# Patient Record
Sex: MALE | Race: WHITE | NOT HISPANIC OR LATINO | Employment: OTHER | URBAN - NONMETROPOLITAN AREA
[De-identification: names, ages, dates, MRNs, and addresses within clinical notes are randomized per-mention and may not be internally consistent; named-entity substitution may affect disease eponyms.]

---

## 2019-10-01 DIAGNOSIS — E78.5 HYPERLIPIDEMIA, UNSPECIFIED HYPERLIPIDEMIA TYPE: Primary | ICD-10-CM

## 2019-10-01 RX ORDER — ATORVASTATIN CALCIUM 40 MG/1
1 TABLET, FILM COATED ORAL DAILY
COMMUNITY
End: 2019-10-01 | Stop reason: SDUPTHER

## 2019-10-02 RX ORDER — ATORVASTATIN CALCIUM 40 MG/1
40 TABLET, FILM COATED ORAL DAILY
Qty: 90 TABLET | Refills: 0 | Status: SHIPPED | OUTPATIENT
Start: 2019-10-02 | End: 2020-01-14 | Stop reason: SDUPTHER

## 2019-10-15 DIAGNOSIS — I10 HYPERTENSION, UNSPECIFIED TYPE: Primary | ICD-10-CM

## 2019-10-15 RX ORDER — LISINOPRIL 40 MG/1
1 TABLET ORAL DAILY
COMMUNITY
End: 2019-10-15 | Stop reason: SDUPTHER

## 2019-10-15 RX ORDER — LISINOPRIL 40 MG/1
40 TABLET ORAL DAILY
Qty: 30 TABLET | Refills: 5 | Status: SHIPPED | OUTPATIENT
Start: 2019-10-15 | End: 2020-04-09 | Stop reason: SDUPTHER

## 2019-12-24 DIAGNOSIS — E78.5 HYPERLIPIDEMIA, UNSPECIFIED HYPERLIPIDEMIA TYPE: ICD-10-CM

## 2019-12-24 RX ORDER — ATORVASTATIN CALCIUM 40 MG/1
TABLET, FILM COATED ORAL
Qty: 90 TABLET | Refills: 0 | OUTPATIENT
Start: 2019-12-24

## 2020-01-02 ENCOUNTER — OFFICE VISIT (OUTPATIENT)
Dept: FAMILY MEDICINE CLINIC | Facility: CLINIC | Age: 70
End: 2020-01-02
Payer: MEDICARE

## 2020-01-02 VITALS
DIASTOLIC BLOOD PRESSURE: 58 MMHG | SYSTOLIC BLOOD PRESSURE: 136 MMHG | WEIGHT: 274.6 LBS | BODY MASS INDEX: 38.44 KG/M2 | HEIGHT: 71 IN | HEART RATE: 76 BPM | OXYGEN SATURATION: 97 %

## 2020-01-02 DIAGNOSIS — R35.1 NOCTURIA: ICD-10-CM

## 2020-01-02 DIAGNOSIS — E78.5 HYPERLIPIDEMIA, UNSPECIFIED HYPERLIPIDEMIA TYPE: ICD-10-CM

## 2020-01-02 DIAGNOSIS — E11.65 UNCONTROLLED TYPE 2 DIABETES MELLITUS WITH HYPERGLYCEMIA (HCC): Primary | ICD-10-CM

## 2020-01-02 DIAGNOSIS — F41.9 ANXIETY: ICD-10-CM

## 2020-01-02 DIAGNOSIS — I10 HYPERTENSION, UNSPECIFIED TYPE: ICD-10-CM

## 2020-01-02 PROCEDURE — 99214 OFFICE O/P EST MOD 30 MIN: CPT | Performed by: FAMILY MEDICINE

## 2020-01-02 RX ORDER — TRAZODONE HYDROCHLORIDE 50 MG/1
1 TABLET ORAL 2 TIMES DAILY
COMMUNITY
Start: 2019-12-21

## 2020-01-02 RX ORDER — PIOGLITAZONEHYDROCHLORIDE 15 MG/1
1 TABLET ORAL DAILY
Refills: 0 | COMMUNITY
Start: 2019-11-01 | End: 2021-06-22 | Stop reason: SDUPTHER

## 2020-01-02 RX ORDER — TRAMADOL HYDROCHLORIDE 50 MG/1
1 TABLET ORAL 3 TIMES DAILY PRN
Refills: 0 | COMMUNITY
Start: 2019-11-01 | End: 2020-01-02 | Stop reason: ALTCHOICE

## 2020-01-02 RX ORDER — ALPRAZOLAM 0.5 MG/1
0.5 TABLET ORAL 3 TIMES DAILY PRN
Qty: 30 TABLET | Refills: 2 | Status: SHIPPED | OUTPATIENT
Start: 2020-01-02 | End: 2020-03-31

## 2020-01-02 RX ORDER — GLYBURIDE 5 MG/1
1 TABLET ORAL 2 TIMES WEEKLY
Refills: 0 | COMMUNITY
Start: 2019-11-13 | End: 2020-02-13 | Stop reason: HOSPADM

## 2020-01-02 NOTE — ASSESSMENT & PLAN NOTE
No results found for: HGBA1C     Patient has uncontrolled diabetes mellitus  I ordered fasting blood work  His last hemoglobin A1c, from the only time I have seen him, was 11 2%  This was done on December 31, 2018  His fasting blood sugar at that time was 194  I reviewed the patient's medications  Without knowing what his blood work looks like, I cannot make any further comments  I am going to refer him to a dietitian  He is not following a diabetic diet  He needs to modify his carbohydrate intake  I recommended he begin an exercise program I also referred him to Ophthalmology for dilated eye exam   I will consider discontinuing his Actos, which was started in [place] this summer  He was not opposed to a once a week injection  However, of his numbers are really poor, we may need to consider starting him on insulin  I will make further recommendations to him when I get his blood test results  I tentatively scheduled him a follow-up visit for 6 weeks

## 2020-01-02 NOTE — ASSESSMENT & PLAN NOTE
Patient's blood pressure is reasonably well controlled    I have not made any changes with his current medication

## 2020-01-02 NOTE — ASSESSMENT & PLAN NOTE
Patient has hyperlipidemia  His goal LDL cholesterol is less than 70  I ordered a fasting lipid panel  I will make further recommendations when I get these results

## 2020-01-02 NOTE — PATIENT INSTRUCTIONS
Foot Care for People with Diabetes   WHAT YOU NEED TO KNOW:   · Foot care helps protect your feet and prevent foot ulcers or sores  Long-term high blood sugar levels can damage the blood vessels and nerves in your legs and feet  This damage makes it hard to feel pressure, pain, temperature, and touch  You may not be able to feel a cut or sore, or shoes that are too tight  Foot care is needed to prevent serious problems, such as an infection or amputation  · Diabetes may cause your toes to become crooked or curved under  These changes may affect the way you walk and can lead to increased pressure on your foot  The pressure can decrease blood flow to your feet  Lack of blood flow increases your risk for a foot ulcer  Do not ignore small problems, such as dry skin or small wounds  These can become life-threatening over time without proper care  DISCHARGE INSTRUCTIONS:   Contact your healthcare provider if:   · Your feet become numb, weak, or hard to move  · You have pus draining from a sore on your foot  · You have a wound on your foot that gets bigger, deeper, or does not heal      · You see blisters, cuts, scratches, calluses, or sores on your foot  · You have a fever, and your feet become red, warm, and swollen  · Your toenails become thick, curled, or yellow  · You find it hard to check your feet because your vision is poor  · You have questions or concerns about your condition or care  Foot care:   · Check your feet each day  Look at your whole foot, including the bottom, and between and under your toes  Check for wounds, corns, and calluses  Use a mirror to see the bottom of your feet  The skin on your feet may be shiny, tight, or darker than normal  Your feet may also be cold and pale  Feel your feet by running your hands along the tops, bottoms, sides, and between your toes  Redness, swelling, and warmth are signs of blood flow problems that can lead to a foot ulcer   Do not try to remove corns or calluses yourself  · Wash your feet each day with soap and warm water  Do not use hot water, because this can injure your foot  Dry your feet gently with a towel after you wash them  Dry between and under your toes  · Apply lotion or a moisturizer on your dry feet  Ask your healthcare provider what lotions are best to use  Do not put lotion or moisturizer between your toes  · Cut your toenails correctly  File or cut your toenails straight across  Use a soft brush to clean around your toenails  If your toenails are very thick, you may need to have a healthcare provider or specialist cut them  · Protect your feet  Do not walk barefoot or wear your shoes without socks  Check your shoes for rocks or other objects that can hurt your feet  Wear cotton socks to help keep your feet dry  Wear socks without toe seams, or wear them with the seams inside out  Change your socks each day  Do not wear socks that are dirty or damp  · Wear shoes that fit well  Wear shoes that do not rub against any area of your feet  Your shoes should be ½ to ¾ inch (1 to 2 centimeters) longer than your feet  Your shoes should also have extra space around the widest part of your feet  Walking or athletic shoes with laces or straps that adjust are best  Ask your healthcare provider for help to choose shoes that fit you best  Ask him if you need to wear an insert, orthotic, or bandage on your feet  Follow up with your healthcare provider or foot specialist as directed: You will need to have your feet checked at least once a year  You may need a foot exam more often if you have nerve damage, foot deformities, or ulcers  Write down your questions so you remember to ask them during your visits  © 2017 [facility] Information is for End User's use only and may not be sold, redistributed or otherwise used for commercial purposes   All illustrations and images included in CareNotes® are the copyrighted property of [employer]  or [employer]ector Mckeon  The above information is an  only  It is not intended as medical advice for individual conditions or treatments  Talk to your doctor, nurse or pharmacist before following any medical regimen to see if it is safe and effective for you

## 2020-01-02 NOTE — ASSESSMENT & PLAN NOTE
Patient has anxiety  He actually had an anxiety attack like he was here  He had to leave the exam room to go get a glass of water  At the conclusion of the visit, he had to go outside and walk around a bit before he can come back in to  his prescriptions for his blood work in his referrals  He had taken Xanax in the past   I refilled his prescription for Xanax to take as needed  He tells me he no longer takes tramadol

## 2020-01-02 NOTE — PROGRESS NOTES
Assessment/Plan:    Uncontrolled type 2 diabetes mellitus with hyperglycemia (HCC)    No results found for: HGBA1C     Patient has uncontrolled diabetes mellitus  I ordered fasting blood work  His last hemoglobin A1c, from the only time I have seen him, was 11 2%  This was done on December 31, 2018  His fasting blood sugar at that time was 194  I reviewed the patient's medications  Without knowing what his blood work looks like, I cannot make any further comments  I am going to refer him to a dietitian  He is not following a diabetic diet  He needs to modify his carbohydrate intake  I recommended he begin an exercise program I also referred him to Ophthalmology for dilated eye exam   I will consider discontinuing his Actos, which was started in [place] this summer  He was not opposed to a once a week injection  However, of his numbers are really poor, we may need to consider starting him on insulin  I will make further recommendations to him when I get his blood test results  I tentatively scheduled him a follow-up visit for 6 weeks  Hypertension  Patient's blood pressure is reasonably well controlled  I have not made any changes with his current medication    Anxiety  Patient has anxiety  He actually had an anxiety attack like he was here  He had to leave the exam room to go get a glass of water  At the conclusion of the visit, he had to go outside and walk around a bit before he can come back in to  his prescriptions for his blood work in his referrals  He had taken Xanax in the past   I refilled his prescription for Xanax to take as needed  He tells me he no longer takes tramadol  Hyperlipidemia  Patient has hyperlipidemia  His goal LDL cholesterol is less than 70  I ordered a fasting lipid panel  I will make further recommendations when I get these results         Diagnoses and all orders for this visit:    Uncontrolled type 2 diabetes mellitus with hyperglycemia ([facility] [address] )  - Ambulatory referral to Nutrition Services; Future  -     Ambulatory referral to Ophthalmology; Future  -     Cancel: Microalbumin / creatinine urine ratio  -     Hemoglobin A1C; Future  -     Comprehensive metabolic panel; Future  -     Microalbumin / creatinine urine ratio    Anxiety  -     ALPRAZolam (XANAX) 0 5 mg tablet; Take 1 tablet (0 5 mg total) by mouth 3 (three) times a day as needed for anxiety    Hyperlipidemia, unspecified hyperlipidemia type  -     Lipid panel; Future    Hypertension, unspecified type    Nocturia  -     PSA Total, Diagnostic; Future    Other orders  -     glyBURIDE (DIABETA) 5 mg tablet; Take 1 tablet by mouth 2 (two) times a week  -     metFORMIN (GLUCOPHAGE) 1000 MG tablet; Take 1 tablet by mouth 2 (two) times a day  -     pioglitazone (ACTOS) 15 mg tablet; Take 1 tablet by mouth daily  -     Discontinue: traMADol (ULTRAM) 50 mg tablet; Take 1 tablet by mouth 3 (three) times a day as needed  -     traZODone (DESYREL) 50 mg tablet; Take 1 tablet by mouth 2 (two) times a day  -     Multiple Vitamins-Minerals (CENTRUM SILVER 50+MEN) TABS; Take 1 tablet by mouth daily        BMI Counseling: Body mass index is 38 3 kg/m²  The BMI is above normal  Nutrition recommendations include decreasing portion sizes, encouraging healthy choices of fruits and vegetables, decreasing fast food intake, consuming healthier snacks, limiting drinks that contain sugar and moderation in carbohydrate intake  Exercise recommendations include exercising 3-5 times per week  No pharmacotherapy was ordered  Patient referred to nutritionist due to patient being overweight  Subjective:      Patient ID: Brielle Albert is a 81 y o  male  This patient is a 61-year-old white male who I have not seen in just over 1 year  The patient tells me he is spending time between [place] and here  He plans to spend his [place] here and is summers in [place]    He tells me he moved back to [place] in mid October  He plans to return to [place] in March  Patient states that his blood sugars have been very high  He he had it checked by an orthopedic surgeon out in [place]  He wanted to have a knee replacement  They would not do it because his diabetes is uncontrolled  He tells me he wound up seeing 2 doctor's in [place] and was scheduled to see an endocrinologist but he he moved back to [place]  He does not know what his hemoglobin A1c was  I do not have any records to review  When I asked him what type of blood sugars he is seeing when he checks it himself, he tells me it depends  He seems to be running 170 is in the morning fasting  He tells me it is usually better in the afternoon  He states he is up-to-date with his colonoscopies  He has had 2 of them, both in [place]  He does not remember the name of the doctor  The following portions of the patient's history were reviewed and updated as appropriate: allergies, current medications, past family history, past medical history, past social history, past surgical history and problem list     Review of Systems   Constitutional: Positive for unexpected weight change  Negative for activity change and appetite change  Respiratory: Negative for cough, shortness of breath and wheezing  Cardiovascular: Negative for chest pain, palpitations and leg swelling  Gastrointestinal: Negative for abdominal distention, abdominal pain, blood in stool, constipation, diarrhea, nausea and vomiting  Endocrine: Negative for polydipsia, polyphagia and polyuria  Neurological: Facial asymmetry:  complains of weight gain  Psychiatric/Behavioral: The patient is nervous/anxious (Reports panic attacks)  Objective:    Patient's shoes and socks removed  Right Foot/Ankle   Right Foot Inspection  Skin Exam: skin normal and skin intact no dry skin, no warmth, no callus, no erythema, no maceration, no abnormal color, no pre-ulcer, no ulcer and no callus                          Toe Exam: no swelling, no tenderness, erythema and  no right toe deformity  Sensory   Vibration: intact  Proprioception: intact   Monofilament testing: intact  Vascular  Capillary refills: < 3 seconds  The right DP pulse is 1+  The right PT pulse is 1+  Left Foot/Ankle  Left Foot Inspection  Skin Exam: skin normal and skin intactno dry skin, no warmth, no erythema, no maceration, normal color, no pre-ulcer, no ulcer and no callus                         Toe Exam: no swelling, no tenderness and no erythema                   Sensory   Vibration: intact  Proprioception: intact  Monofilament: intact  Vascular  Capillary refills: < 3 seconds  The left DP pulse is 1+  The left PT pulse is 1+  Assign Risk Category:  No deformity present; No loss of protective sensation; No weak pulses       Risk: 0    /58 (BP Location: Left arm, Patient Position: Sitting, Cuff Size: Large)   Pulse 76   Ht 5' 11" (1 803 m)   Wt 125 kg (274 lb 9 6 oz)   SpO2 97%   BMI 38 30 kg/m²          Physical Exam   Constitutional:   The patient is an obese 71-year-old white male who appears his stated age  He is in no apparent distress   HENT:   Head: Normocephalic and atraumatic  Right Ear: External ear normal    Left Ear: External ear normal    Mouth/Throat: Oropharynx is clear and moist  No oropharyngeal exudate  Tympanic membranes are clear   Eyes: Pupils are equal, round, and reactive to light  Conjunctivae are normal  No scleral icterus  Neck: Neck supple  No tracheal deviation present  No thyromegaly present  There are no carotid bruits   Cardiovascular: Normal rate, regular rhythm and normal heart sounds  Exam reveals no gallop and no friction rub  Pulses are no weak pulses  No murmur heard  Pulses:       Dorsalis pedis pulses are 1+ on the right side, and 1+ on the left side  Posterior tibial pulses are 1+ on the right side, and 1+ on the left side     Pulmonary/Chest: Effort normal and breath sounds normal  No stridor  No respiratory distress  He has no wheezes  He has no rales  Abdominal: Soft  Bowel sounds are normal  He exhibits no distension and no mass  There is no tenderness  There is no rebound and no guarding  There is no organomegaly noted   Feet:   Right Foot:   Skin Integrity: Negative for ulcer, skin breakdown, erythema, warmth, callus or dry skin  Left Foot:   Skin Integrity: Negative for ulcer, skin breakdown, erythema, warmth, callus or dry skin  Lymphadenopathy:     He has no cervical adenopathy  Vitals reviewed      extremities:  Without cyanosis, clubbing, or edema

## 2020-01-09 ENCOUNTER — APPOINTMENT (OUTPATIENT)
Dept: LAB | Facility: MEDICAL CENTER | Age: 70
End: 2020-01-09
Payer: MEDICARE

## 2020-01-09 DIAGNOSIS — R35.1 NOCTURIA: ICD-10-CM

## 2020-01-09 DIAGNOSIS — E78.5 HYPERLIPIDEMIA, UNSPECIFIED HYPERLIPIDEMIA TYPE: ICD-10-CM

## 2020-01-09 DIAGNOSIS — E11.65 UNCONTROLLED TYPE 2 DIABETES MELLITUS WITH HYPERGLYCEMIA (HCC): ICD-10-CM

## 2020-01-09 LAB
ALBUMIN SERPL BCP-MCNC: 3.8 G/DL (ref 3.5–5)
ALP SERPL-CCNC: 94 U/L (ref 46–116)
ALT SERPL W P-5'-P-CCNC: 60 U/L (ref 12–78)
ANION GAP SERPL CALCULATED.3IONS-SCNC: 6 MMOL/L (ref 4–13)
AST SERPL W P-5'-P-CCNC: 26 U/L (ref 5–45)
BILIRUB SERPL-MCNC: 0.4 MG/DL (ref 0.2–1)
BUN SERPL-MCNC: 17 MG/DL (ref 5–25)
CALCIUM SERPL-MCNC: 9.5 MG/DL (ref 8.3–10.1)
CHLORIDE SERPL-SCNC: 107 MMOL/L (ref 100–108)
CHOLEST SERPL-MCNC: 178 MG/DL (ref 50–200)
CO2 SERPL-SCNC: 26 MMOL/L (ref 21–32)
CREAT SERPL-MCNC: 1.24 MG/DL (ref 0.6–1.3)
CREAT UR-MCNC: 488 MG/DL
GFR SERPL CREATININE-BSD FRML MDRD: 60 ML/MIN/1.73SQ M
GLUCOSE P FAST SERPL-MCNC: 185 MG/DL (ref 65–99)
HDLC SERPL-MCNC: 51 MG/DL
LDLC SERPL CALC-MCNC: 78 MG/DL (ref 0–100)
MICROALBUMIN UR-MCNC: 115 MG/L (ref 0–20)
MICROALBUMIN/CREAT 24H UR: 24 MG/G CREATININE (ref 0–30)
NONHDLC SERPL-MCNC: 127 MG/DL
POTASSIUM SERPL-SCNC: 4.7 MMOL/L (ref 3.5–5.3)
PROT SERPL-MCNC: 7.5 G/DL (ref 6.4–8.2)
PSA SERPL-MCNC: 5 NG/ML (ref 0–4)
SODIUM SERPL-SCNC: 139 MMOL/L (ref 136–145)
TRIGL SERPL-MCNC: 244 MG/DL

## 2020-01-09 PROCEDURE — 80053 COMPREHEN METABOLIC PANEL: CPT

## 2020-01-09 PROCEDURE — 82570 ASSAY OF URINE CREATININE: CPT | Performed by: FAMILY MEDICINE

## 2020-01-09 PROCEDURE — 84153 ASSAY OF PSA TOTAL: CPT

## 2020-01-09 PROCEDURE — 36415 COLL VENOUS BLD VENIPUNCTURE: CPT

## 2020-01-09 PROCEDURE — 80061 LIPID PANEL: CPT

## 2020-01-09 PROCEDURE — 82043 UR ALBUMIN QUANTITATIVE: CPT | Performed by: FAMILY MEDICINE

## 2020-01-10 DIAGNOSIS — E11.65 UNCONTROLLED TYPE 2 DIABETES MELLITUS WITH HYPERGLYCEMIA (HCC): Primary | ICD-10-CM

## 2020-01-13 ENCOUNTER — APPOINTMENT (OUTPATIENT)
Dept: LAB | Facility: MEDICAL CENTER | Age: 70
End: 2020-01-13
Payer: MEDICARE

## 2020-01-13 LAB
EST. AVERAGE GLUCOSE BLD GHB EST-MCNC: 197 MG/DL
HBA1C MFR BLD: 8.5 % (ref 4.2–6.3)

## 2020-01-13 PROCEDURE — 36415 COLL VENOUS BLD VENIPUNCTURE: CPT

## 2020-01-13 PROCEDURE — 83036 HEMOGLOBIN GLYCOSYLATED A1C: CPT

## 2020-01-14 DIAGNOSIS — E78.5 HYPERLIPIDEMIA, UNSPECIFIED HYPERLIPIDEMIA TYPE: ICD-10-CM

## 2020-01-15 RX ORDER — ATORVASTATIN CALCIUM 40 MG/1
40 TABLET, FILM COATED ORAL DAILY
Qty: 90 TABLET | Refills: 3 | Status: SHIPPED | OUTPATIENT
Start: 2020-01-15 | End: 2021-04-07 | Stop reason: SDUPTHER

## 2020-01-16 DIAGNOSIS — E11.65 UNCONTROLLED TYPE 2 DIABETES MELLITUS WITH HYPERGLYCEMIA (HCC): Primary | ICD-10-CM

## 2020-01-16 NOTE — PROGRESS NOTES
I spoke to the patient today and informed him of his blood test results  Fasting blood sugars 185  Hemoglobin A1c is 8 5  Patient tells me that since his visit, he has been following my instructions  He is limiting his carbs  He cut out sugary drinks  He cut out sugary snacks  He has been eating smaller portion size  He has lost 5 lb in his blood sugars have been coming down nicely  He does not want to changes medicine at this time  I told him we need to get his hemoglobin A1c down  His goal is less than 7 0  We will need to consider titrating the dose of his medication if it does not come down  I reviewed his lipid panel with him  Cholesterol was satisfactory but triglycerides are elevated  Weight loss will help this as well  Lastly, we discussed his PSA  I have recommended consultation with Urology  He was agreeable  He will be referred to Dr Tiffanie Hooks

## 2020-01-27 LAB
LEFT EYE DIABETIC RETINOPATHY: NORMAL
RIGHT EYE DIABETIC RETINOPATHY: NORMAL

## 2020-02-03 DIAGNOSIS — E11.65 UNCONTROLLED TYPE 2 DIABETES MELLITUS WITH HYPERGLYCEMIA (HCC): Primary | ICD-10-CM

## 2020-02-04 ENCOUNTER — CONSULT (OUTPATIENT)
Dept: UROLOGY | Facility: CLINIC | Age: 70
End: 2020-02-04
Payer: MEDICARE

## 2020-02-04 VITALS
SYSTOLIC BLOOD PRESSURE: 122 MMHG | DIASTOLIC BLOOD PRESSURE: 72 MMHG | BODY MASS INDEX: 36.42 KG/M2 | WEIGHT: 260.14 LBS | HEIGHT: 71 IN | HEART RATE: 78 BPM

## 2020-02-04 DIAGNOSIS — R97.20 ELEVATED PSA: Primary | ICD-10-CM

## 2020-02-04 DIAGNOSIS — E11.65 UNCONTROLLED TYPE 2 DIABETES MELLITUS WITH HYPERGLYCEMIA (HCC): ICD-10-CM

## 2020-02-04 LAB
POST-VOID RESIDUAL VOLUME, ML POC: 0 ML
SL AMB  POCT GLUCOSE, UA: 2000
SL AMB LEUKOCYTE ESTERASE,UA: NORMAL
SL AMB POCT BILIRUBIN,UA: NORMAL
SL AMB POCT BLOOD,UA: NORMAL
SL AMB POCT CLARITY,UA: CLEAR
SL AMB POCT COLOR,UA: YELLOW
SL AMB POCT KETONES,UA: NORMAL
SL AMB POCT NITRITE,UA: NORMAL
SL AMB POCT PH,UA: 5
SL AMB POCT SPECIFIC GRAVITY,UA: 1.02
SL AMB POCT URINE PROTEIN: NORMAL
SL AMB POCT UROBILINOGEN: NORMAL

## 2020-02-04 PROCEDURE — 81002 URINALYSIS NONAUTO W/O SCOPE: CPT | Performed by: PHYSICIAN ASSISTANT

## 2020-02-04 PROCEDURE — 99203 OFFICE O/P NEW LOW 30 MIN: CPT | Performed by: PHYSICIAN ASSISTANT

## 2020-02-04 PROCEDURE — 51798 US URINE CAPACITY MEASURE: CPT | Performed by: PHYSICIAN ASSISTANT

## 2020-02-04 NOTE — PROGRESS NOTES
2/4/2020      Chief Complaint   Patient presents with    Elevated PSA         Assessment and Plan    78 y o  male managed by NEW PATIENT    1  Elevated PSA  - PSA 5 0 (1/9/2020)  - SHANNAN smooth, mildly enlarged, without appreciable nodule  - positive family history (father)    Offer patient this is likely his baseline, is minimally elevated for his age  His digital rectal exam is overall benign  He is unsure if he will be able to get records of prior PSAs for comparison  I have offered him repeat PSA in about six months  He is moving back to [place] next week and does not know if or when he will be back but he may do six months here and six months there throughout the year  He will notify us when he is back in town to schedule a follow-up appointment with labs prior  History of Present Illness  Michelle Santo is a 78 y o  male here for evaluation of new patient evaluation of elevated PSA  Recent screening labs include a PSA on 01/09/2020, PSA was 5 0  Patient reports prior PSA did just above baseline in his home state of [place]  He is not sure specific numbers  He is not sure if he has ever had a formal digital rectal exam for prostate exam however he has had colonoscopies  He does have a positive family history, his father was diagnosed with prostate cancer around age [de-identified], and he went on delivered age 82  Patient has 0 lower urinary tract symptoms or bother  No UTIs and no hematuria  No ejaculatory concerns  Review of Systems   Constitutional: Negative  Respiratory: Negative  Cardiovascular: Negative  Genitourinary: Negative for decreased urine volume, difficulty urinating, dysuria, flank pain, frequency, hematuria and urgency  Musculoskeletal: Negative  Urinary Incontinence Screening      Most Recent Value   Urinary Incontinence   Urinary Incontinence? No   Incomplete emptying? No   Urinary frequency? No   Urinary urgency? No   Urinary hesitancy?   No   Dysuria (painful difficult urination)? No   Nocturia (waking up to use the bathroom)? No   Straining (having to push to go)? No   Weak stream?  No   Intermittent stream?  No   Post void dribbling? No               Past Medical History  Past Medical History:   Diagnosis Date    Hyperlipidemia     last assessed: 12/31/2018    Hypertension     last assessed: 12/31/2018    Osteoarthritis     last assessed: 12/31/2018    Type 2 diabetes mellitus ([facility] )     last assessed: 12/31/2018     Past Social History  History reviewed  No pertinent surgical history    Social History     Tobacco Use   Smoking Status Former Smoker   Smokeless Tobacco Never Used     Past Family History  Family History   Problem Relation Age of Onset    Parkinsonism Mother     Breast cancer Sister      Past Social history  Social History     Socioeconomic History    Marital status:      Spouse name: Not on file    Number of children: Not on file    Years of education: Not on file    Highest education level: Not on file   Occupational History    Not on file   Social Needs    Financial resource strain: Not on file    Food insecurity:     Worry: Not on file     Inability: Not on file    Transportation needs:     Medical: Not on file     Non-medical: Not on file   Tobacco Use    Smoking status: Former Smoker    Smokeless tobacco: Never Used   Substance and Sexual Activity    Alcohol use: Not Currently    Drug use: Never    Sexual activity: Not on file   Lifestyle    Physical activity:     Days per week: Not on file     Minutes per session: Not on file    Stress: Not on file   Relationships    Social connections:     Talks on phone: Not on file     Gets together: Not on file     Attends Congregation service: Not on file     Active member of club or organization: Not on file     Attends meetings of clubs or organizations: Not on file     Relationship status: Not on file    Intimate partner violence:     Fear of current or ex partner: Not on file     Emotionally abused: Not on file     Physically abused: Not on file     Forced sexual activity: Not on file   Other Topics Concern    Not on file   Social History Narrative    Not on file     Current Medications  Current Outpatient Medications   Medication Sig Dispense Refill    ALPRAZolam (XANAX) 0 5 mg tablet Take 1 tablet (0 5 mg total) by mouth 3 (three) times a day as needed for anxiety 30 tablet 2    atorvastatin (LIPITOR) 40 mg tablet Take 1 tablet (40 mg total) by mouth daily 90 tablet 3    glyBURIDE (DIABETA) 5 mg tablet Take 1 tablet by mouth 2 (two) times a week  0    lisinopril (ZESTRIL) 40 mg tablet Take 1 tablet (40 mg total) by mouth daily 30 tablet 5    metFORMIN (GLUCOPHAGE) 1000 MG tablet Take 1 tablet (1,000 mg total) by mouth 2 (two) times a day 180 tablet 1    Multiple Vitamins-Minerals (CENTRUM SILVER 50+MEN) TABS Take 1 tablet by mouth daily      pioglitazone (ACTOS) 15 mg tablet Take 1 tablet by mouth daily  0    traZODone (DESYREL) 50 mg tablet Take 1 tablet by mouth 2 (two) times a day       No current facility-administered medications for this visit  Allergies  No Known Allergies      The following portions of the patient's history were reviewed and updated as appropriate: allergies, current medications, past medical history, past social history, past surgical history and problem list       Vitals  Vitals:    02/04/20 1347   BP: 122/72   Pulse: 78   Weight: 118 kg (260 lb 2 3 oz)   Height: 5' 11" (1 803 m)       Physical Exam   Constitutional: He is oriented to person, place, and time  He appears well-developed and well-nourished  No distress  HENT:   Head: Normocephalic and atraumatic  Pulmonary/Chest: Effort normal    Genitourinary:   Genitourinary Comments: Circumcised penis, normal phallus, orthotopic and patent meatus  Testes smooth descended bilaterally to scrotum    Skin is normal   Digital rectal exam reveals enlarged about 40 gram prostate, smooth, without appreciable nodule, nontender   Musculoskeletal: He exhibits no edema  Neurological: He is alert and oriented to person, place, and time  Gait normal    Skin: Skin is warm and dry  He is not diaphoretic  Psychiatric: He has a normal mood and affect  His speech is normal and behavior is normal    Nursing note and vitals reviewed          Results  No results found for this or any previous visit (from the past 1 hour(s)) ]  Lab Results   Component Value Date    PSA 5 0 (H) 01/09/2020     Lab Results   Component Value Date    CALCIUM 9 5 01/09/2020    K 4 7 01/09/2020    CO2 26 01/09/2020     01/09/2020    BUN 17 01/09/2020    CREATININE 1 24 01/09/2020     No results found for: WBC, HGB, HCT, MCV, PLT      Orders  Orders Placed This Encounter   Procedures    PSA Total, Diagnostic     Standing Status:   Future     Standing Expiration Date:   2/4/2021    POCT urine dip    POCT Measure PVR

## 2020-02-04 NOTE — PATIENT INSTRUCTIONS
Prostate specific antigen measurement   GENERAL INFORMATION:  What is this test?  This test measures the amount of prostate specific antigen (PSA) in blood  This test may be used when benign prostatic hyperplasia (BPH) is suspected  It may also be used to screen for prostate cancer, to help diagnose prostate cancer when it is suspected, and to monitor prostate cancer after treatment  What are other names for this test?  · PSA measurement  · PSA - Prostate-specific antigen level  · PSA - Serum prostate specific antigen level  · tPSA measurement - Total prostate specific antigen measurement  What are related tests? · Rectal examination  · Transrectal biopsy of prostate  Why do I need this test?  Laboratory tests may be done for many reasons  Tests are performed for routine health screenings or if a disease or toxicity is suspected  Lab tests may be used to determine if a medical condition is improving or worsening  Lab tests may also be used to measure the success or failure of a medication or treatment plan  Lab tests may be ordered for professional or legal reasons  You may need this test if you have:   · BPH - Benign prostatic hypertrophy  · Prostate cancer  When and how often should I have this test?  When and how often laboratory tests are done may depend on many factors  The timing of laboratory tests may rely on the results or completion of other tests, procedures, or treatments  Lab tests may be performed immediately in an emergency, or tests may be delayed as a condition is treated or monitored  A test may be suggested or become necessary when certain signs or symptoms appear  Due to changes in the way your body naturally functions through the course of a day, lab tests may need to be performed at a certain time of day  If you have prepared for a test by changing your food or fluid intake, lab tests may be timed in accordance with those changes   Timing of tests may be based on increased and decreased levels of medications, drugs or other substances in the body  The age or gender of the person being tested may affect when and how often a lab test is required  Chronic or progressive conditions may need ongoing monitoring through the use of lab tests  Conditions that worsen and improve may also need frequent monitoring  Certain tests may be repeated to obtain a series of results, or tests may need to be repeated to confirm or disprove results  Timing and frequency of lab tests may vary if they are performed for professional or legal reasons  How should I get ready for the test?  Before having blood collected, tell the person drawing your blood if you are allergic to latex  Tell the healthcare worker if you have a medical condition or are using a medication or supplement that causes excessive bleeding  Also tell the healthcare worker if you have felt nauseated, lightheaded, or have fainted while having blood drawn in the past   How is the test done? When a blood sample from a vein is needed, a vein in your arm is usually selected  A tourniquet (large rubber strap) may be secured above the vein  The skin over the vein will be cleaned, and a needle will be inserted  You will be asked to hold very still while your blood is collected  Blood will be collected into one or more tubes, and the tourniquet will be removed  When enough blood has been collected, the healthcare worker will take the needle out  How will the test feel? The amount of discomfort you feel will depend on many factors, including your sensitivity to pain  Communicate how you are feeling with the person doing the test  Inform the person doing the test if you feel that you cannot continue with the test   During a blood draw, you may feel mild discomfort at the location where the blood sample is being collected    What should I do after the test?  After a blood sample is collected from your vein, a bandage, cotton ball, or gauze may be placed on the area where the needle was inserted  You may be asked to apply pressure to the area  Avoid strenuous exercise immediately after your blood draw  Contact your healthcare worker if you feel pain or see redness, swelling, or discharge from the puncture site  What are the risks? Blood: During a blood draw, a hematoma (blood-filled bump under the skin) or slight bleeding from the puncture site may occur  After a blood draw, a bruise or infection may occur at the puncture site  The person doing this test may need to perform it more than once  Talk to your healthcare worker if you have any concerns about the risks of this test   What are normal results for this test?  Laboratory test results may vary depending on your age, gender, health history, the method used for the test, and many other factors  If your results are different from the results suggested below, this may not mean that you have a disease  Contact your healthcare worker if you have any questions  The following are considered to be normal results for this test:  · Males, ?36years of age: 0-2 ng/mL (0-2 mcg/L) :  · Males, >36years of age: 0-4 ng/mL (0-4 mcg/L)  · Females: <0 5 ng/mL (<0 5 mcg/L) : What might affect my test results? Drug Therapy Use:  Some medications may affect the results of the test  These medications include:  · Finasteride (Oral route, Tablet)  · Indium In 111 Capromab Pendetide (Intravenous route, Kit)  Ejaculation and digital rectal exam can cause an insignificant rise in PSA levels while prostate biopsy and cystoscopy can cause substantial increases; PSA testing should be delayed until 3 to 4 weeks after these procedures  Exercise, infection, and some medications can also cause a rise in PSA levels  Finasteride will lower PSA by approximately 50%   What follow up should I do after this test?  Ask your healthcare worker how you will be informed of the test results   You may be asked to call for results, schedule an appointment to discuss results, or notified of results by mail  Follow up care varies depending on many factors related to your test  Sometimes there is no follow up after you have been notified of test results  At other times follow up may be suggested or necessary  Some examples of follow up care include changes to medication or treatment plans, referral to a specialist, more or less frequent monitoring, and additional tests or procedures  Talk with your healthcare worker about any concerns or questions you have regarding follow up care or instructions  Where can I get more information? Related Companies   ? American Urological Association - https://www alexander org/  org  ? National Kidney and Urologic Diseases Information Clearinghouse - http://kidney  niddk nih gov/    CARE AGREEMENT:   You have the right to help plan your care  To help with this plan, you must learn about your health condition and how it may be treated  You can then discuss treatment options with your caregivers  Work with them to decide what care may be used to treat you  You always have the right to refuse treatment  © Copyright [employer] 2018  Information is for End User's use only and may not be sold, redistributed or otherwise used for commercial purposes  The above information is an  only  It is not intended as a substitute for medical advice for your individual conditions or treatments  Talk to your doctor, nurse or pharmacist before following any medical regimen to see if it is safe and effective for you  done

## 2020-02-12 ENCOUNTER — TRANSCRIBE ORDERS (OUTPATIENT)
Dept: LAB | Facility: HOSPITAL | Age: 70
End: 2020-02-12

## 2020-02-13 ENCOUNTER — OFFICE VISIT (OUTPATIENT)
Dept: FAMILY MEDICINE CLINIC | Facility: CLINIC | Age: 70
End: 2020-02-13
Payer: MEDICARE

## 2020-02-13 VITALS
DIASTOLIC BLOOD PRESSURE: 80 MMHG | SYSTOLIC BLOOD PRESSURE: 130 MMHG | HEIGHT: 71 IN | OXYGEN SATURATION: 94 % | BODY MASS INDEX: 37.24 KG/M2 | HEART RATE: 78 BPM | WEIGHT: 266 LBS

## 2020-02-13 DIAGNOSIS — E11.65 UNCONTROLLED TYPE 2 DIABETES MELLITUS WITH HYPERGLYCEMIA (HCC): Primary | ICD-10-CM

## 2020-02-13 DIAGNOSIS — E78.2 MIXED HYPERLIPIDEMIA: ICD-10-CM

## 2020-02-13 DIAGNOSIS — Z00.00 ENCOUNTER FOR MEDICARE ANNUAL WELLNESS EXAM: ICD-10-CM

## 2020-02-13 DIAGNOSIS — I10 ESSENTIAL HYPERTENSION: ICD-10-CM

## 2020-02-13 PROCEDURE — 1036F TOBACCO NON-USER: CPT | Performed by: FAMILY MEDICINE

## 2020-02-13 PROCEDURE — 2022F DILAT RTA XM EVC RTNOPTHY: CPT | Performed by: FAMILY MEDICINE

## 2020-02-13 PROCEDURE — 99213 OFFICE O/P EST LOW 20 MIN: CPT | Performed by: FAMILY MEDICINE

## 2020-02-13 PROCEDURE — G0438 PPPS, INITIAL VISIT: HCPCS | Performed by: FAMILY MEDICINE

## 2020-02-13 PROCEDURE — 3075F SYST BP GE 130 - 139MM HG: CPT | Performed by: FAMILY MEDICINE

## 2020-02-13 PROCEDURE — 3008F BODY MASS INDEX DOCD: CPT | Performed by: FAMILY MEDICINE

## 2020-02-13 PROCEDURE — 3079F DIAST BP 80-89 MM HG: CPT | Performed by: FAMILY MEDICINE

## 2020-02-13 PROCEDURE — 1123F ACP DISCUSS/DSCN MKR DOCD: CPT | Performed by: FAMILY MEDICINE

## 2020-02-13 PROCEDURE — 3060F POS MICROALBUMINURIA REV: CPT | Performed by: FAMILY MEDICINE

## 2020-02-13 PROCEDURE — 1170F FXNL STATUS ASSESSED: CPT | Performed by: FAMILY MEDICINE

## 2020-02-13 PROCEDURE — 1160F RVW MEDS BY RX/DR IN RCRD: CPT | Performed by: FAMILY MEDICINE

## 2020-02-13 PROCEDURE — 1125F AMNT PAIN NOTED PAIN PRSNT: CPT | Performed by: FAMILY MEDICINE

## 2020-02-13 RX ORDER — GLIMEPIRIDE 4 MG/1
4 TABLET ORAL 2 TIMES DAILY
Qty: 180 TABLET | Refills: 2 | Status: SHIPPED | OUTPATIENT
Start: 2020-02-13 | End: 2021-03-05

## 2020-02-13 NOTE — PROGRESS NOTES
Assessment and Plan:     Problem List Items Addressed This Visit     None           Preventive health issues were discussed with patient, and age appropriate screening tests were ordered as noted in patient's After Visit Summary  Personalized health advice and appropriate referrals for health education or preventive services given if needed, as noted in patient's After Visit Summary  History of Present Illness:     Patient presents for Medicare Annual Wellness visit    Patient Care Team:  Victor Hugo Cervantes DO as PCP - General (Family Medicine)     Problem List:     Patient Active Problem List   Diagnosis    Uncontrolled type 2 diabetes mellitus with hyperglycemia ([address] )    Hyperlipidemia    Hypertension    Anxiety    Nocturia      Past Medical and Surgical History:     Past Medical History:   Diagnosis Date    Hyperlipidemia     last assessed: 12/31/2018    Hypertension     last assessed: 12/31/2018    Osteoarthritis     last assessed: 12/31/2018    Type 2 diabetes mellitus ([facility] [address] )     last assessed: 12/31/2018     History reviewed  No pertinent surgical history     Family History:     Family History   Problem Relation Age of Onset   Saniya Augustin Parkinsonism Mother     Breast cancer Sister       Social History:     E-Cigarette/Vaping    E-Cigarette Use Never User      E-Cigarette/Vaping Substances    Nicotine No     THC No     CBD No     Flavoring No     Other No     Unknown No      Social History     Socioeconomic History    Marital status:      Spouse name: None    Number of children: None    Years of education: None    Highest education level: None   Occupational History    None   Social Needs    Financial resource strain: None    Food insecurity:     Worry: None     Inability: None    Transportation needs:     Medical: None     Non-medical: None   Tobacco Use    Smoking status: Former Smoker    Smokeless tobacco: Never Used   Substance and Sexual Activity    Alcohol use: Not Currently Frequency: Monthly or less     Drinks per session: 1 or 2     Binge frequency: Never    Drug use: Never    Sexual activity: None   Lifestyle    Physical activity:     Days per week: None     Minutes per session: None    Stress: None   Relationships    Social connections:     Talks on phone: None     Gets together: None     Attends Zoroastrianism service: None     Active member of club or organization: None     Attends meetings of clubs or organizations: None     Relationship status: None    Intimate partner violence:     Fear of current or ex partner: None     Emotionally abused: None     Physically abused: None     Forced sexual activity: None   Other Topics Concern    None   Social History Narrative    None      Medications and Allergies:     Current Outpatient Medications   Medication Sig Dispense Refill    ALPRAZolam (XANAX) 0 5 mg tablet Take 1 tablet (0 5 mg total) by mouth 3 (three) times a day as needed for anxiety 30 tablet 2    atorvastatin (LIPITOR) 40 mg tablet Take 1 tablet (40 mg total) by mouth daily 90 tablet 3    glyBURIDE (DIABETA) 5 mg tablet Take 1 tablet by mouth 2 (two) times a week  0    lisinopril (ZESTRIL) 40 mg tablet Take 1 tablet (40 mg total) by mouth daily 30 tablet 5    metFORMIN (GLUCOPHAGE) 1000 MG tablet Take 1 tablet (1,000 mg total) by mouth 2 (two) times a day 180 tablet 1    Multiple Vitamins-Minerals (CENTRUM SILVER 50+MEN) TABS Take 1 tablet by mouth daily      pioglitazone (ACTOS) 15 mg tablet Take 1 tablet by mouth daily  0    traZODone (DESYREL) 50 mg tablet Take 1 tablet by mouth 2 (two) times a day       No current facility-administered medications for this visit        No Known Allergies   Immunizations:     Immunization History   Administered Date(s) Administered    Tdap 08/16/2016      Health Maintenance:         Topic Date Due    Hepatitis C Screening  1950    CRC Screening: Colonoscopy  1950         Topic Date Due    Pneumococcal Vaccine: 65+ Years (1 of 2 - PCV13) 02/02/2017      Medicare Health Risk Assessment:     /80 (BP Location: Left arm, Patient Position: Sitting, Cuff Size: Large)   Pulse 78   Ht 5' 11" (1 803 m)   Wt 121 kg (266 lb)   SpO2 94%   BMI 37 10 kg/m²      Irene Galloway is here for his Initial Wellness visit  Health Risk Assessment:   Patient rates overall health as good  Patient feels that their physical health rating is same  Eyesight was rated as much worse  Hearing was rated as same  Patient feels that their emotional and mental health rating is same  Pain experienced in the last 7 days has been none  Patient states that he has experienced no weight loss or gain in last 6 months  Depression Screening:   PHQ-2 Score: 0      Fall Risk Screening: In the past year, patient has experienced: no history of falling in past year      Home Safety:  Patient does not have trouble with stairs inside or outside of their home  Patient has working smoke alarms and has working carbon monoxide detector  Home safety hazards include: not having non-slip bath and/or shower mats  Nutrition:   Current diet is Regular  Medications:   Patient is currently taking over-the-counter supplements  OTC medications include: see medication list  Patient is able to manage medications  Activities of Daily Living (ADLs)/Instrumental Activities of Daily Living (IADLs):   Walk and transfer into and out of bed and chair?: Yes  Dress and groom yourself?: Yes    Bathe or shower yourself?: Yes    Feed yourself? Yes  Do your laundry/housekeeping?: Yes  Manage your money, pay your bills and track your expenses?: Yes  Make your own meals?: Yes    Do your own shopping?: Yes    Previous Hospitalizations:   Any hospitalizations or ED visits within the last 12 months?: No      Advance Care Planning:   Living will: Yes    Durable POA for healthcare:  Yes    Advanced directive: Yes      Cognitive Screening:   Provider or family/friend/caregiver concerned regarding cognition?: No    PREVENTIVE SCREENINGS      Cardiovascular Screening:    General: Screening Not Indicated and History Lipid Disorder      Diabetes Screening:     General: Screening Not Indicated and History Diabetes      Colorectal Cancer Screening:     General: Screening Current      Prostate Cancer Screening:    General: Screening Current      Osteoporosis Screening:    General: Screening Not Indicated      Abdominal Aortic Aneurysm (AAA) Screening:    Risk factors include: age between 73-67 yo and tobacco use        General: Risks and Benefits Discussed and Patient Declines      Lung Cancer Screening:     General: Screening Not Indicated      Hepatitis C Screening:    General: Risks and Benefits Discussed    Hep C Screening Accepted: Yes        Mortimer Boards, DO

## 2020-02-13 NOTE — PATIENT INSTRUCTIONS

## 2020-02-13 NOTE — PROGRESS NOTES
Assessment/Plan:    Uncontrolled type 2 diabetes mellitus with hyperglycemia (HCC)    Lab Results   Component Value Date    HGBA1C 8 5 (H) 01/13/2020    Patient's hemoglobin A1c has improved  However, it is still much higher than I would like to see  Given the fact that he is only 78years old, I would like to see his hemoglobin A1c less than 7 0%  I explained to the patient that weight loss is indicated  I would like to continue to exercise  I would like to try to exercise more frequently  He has lost 8 lb since previous visit  I would like to continue this  I recommended we change his glyburide to Amaryl 4 mg twice a day  I advised the patient to see a family doctor regularly in [place] for follow-up  When he returns to this area in September, he needs to contact me for an appointment  He recently had his dilated eye exam   There was no evidence of diabetic retinopathy  However, he does have cataracts  They require surgical intervention  He plans to have this done in [place]  Certainly, the patient's fragmented care has made it more difficult to control is diabetes  Ultimately, I would like to see him off p o  Glipizide own and started on some other medication, perhaps Tradjenta or Trulicity  Regardless, this is the only change that I have made for now  He will have to follow-up with someone in [place]  Hypertension  Blood pressure is well controlled and I have recommended no change with his current medication    Hyperlipidemia  Continue atorvastatin 40 mg daily  His goal LDL cholesterol is less than 70  His triglycerides were noted to be elevated at 224  I believe that if he is able to continue to lose weight, his triglycerides will come down  Diagnoses and all orders for this visit:    Uncontrolled type 2 diabetes mellitus with hyperglycemia (HCC)  -     glimepiride (AMARYL) 4 mg tablet;  Take 1 tablet (4 mg total) by mouth 2 (two) times a day    Encounter for Medicare annual wellness exam    Essential hypertension    Mixed hyperlipidemia        We discussed screening colonoscopy  He reports he had a colonoscopy done 4 years ago in [place]  We will need to try to get the results faxed to us  Subjective:      Patient ID: Santa Epley is a 78 y o  male  This is a 16-year-old white male presents to the office today for his annual Medicare wellness exam as well as for a recheck on his diabetes  The patient tells me he is walking regularly for exercise, although he walks less than when he is in [place]  He is planning on leaving for [place] in early March  He plans to stay about 6 months there and then come back  He reports his blood sugars have been around 155 or so fasting in the morning  He is trying to watch his diet  He has not had any hypoglycemic events  The following portions of the patient's history were reviewed and updated as appropriate: allergies, current medications, past family history, past medical history, past social history, past surgical history and problem list     Review of Systems   Respiratory: Negative for cough, shortness of breath and wheezing  Cardiovascular: Negative for chest pain, palpitations and leg swelling  Gastrointestinal: Negative for abdominal distention, abdominal pain, blood in stool, constipation, diarrhea, nausea and vomiting  Endocrine: Negative for polydipsia, polyphagia and polyuria  Objective:      /80 (BP Location: Left arm, Patient Position: Sitting, Cuff Size: Large)   Pulse 78   Ht 5' 11" (1 803 m)   Wt 121 kg (266 lb)   SpO2 94%   BMI 37 10 kg/m²          Physical Exam   Constitutional: He appears well-developed and well-nourished  HENT:   Head: Normocephalic and atraumatic  Right Ear: External ear normal    Left Ear: External ear normal    Mouth/Throat: Oropharynx is clear and moist  No oropharyngeal exudate  Eyes: Pupils are equal, round, and reactive to light   Conjunctivae are normal  No scleral icterus  Neck: Neck supple  No tracheal deviation present  No thyromegaly present  There are no carotid bruits noted   Cardiovascular: Normal rate, regular rhythm and normal heart sounds  Exam reveals no gallop and no friction rub  No murmur heard  Pulmonary/Chest: Effort normal  No stridor  No respiratory distress  He has no wheezes  He has no rales  Abdominal: Soft  Bowel sounds are normal  He exhibits no distension and no mass  There is no tenderness  There is no guarding  There was no organomegaly noted   Lymphadenopathy:     He has no cervical adenopathy  Vitals reviewed

## 2020-02-14 NOTE — ASSESSMENT & PLAN NOTE
Continue atorvastatin 40 mg daily  His goal LDL cholesterol is less than 70  His triglycerides were noted to be elevated at 224  I believe that if he is able to continue to lose weight, his triglycerides will come down

## 2020-02-14 NOTE — ASSESSMENT & PLAN NOTE
Lab Results   Component Value Date    HGBA1C 8 5 (H) 01/13/2020    Patient's hemoglobin A1c has improved  However, it is still much higher than I would like to see  Given the fact that he is only 78years old, I would like to see his hemoglobin A1c less than 7 0%  I explained to the patient that weight loss is indicated  I would like to continue to exercise  I would like to try to exercise more frequently  He has lost 8 lb since previous visit  I would like to continue this  I recommended we change his glyburide to Amaryl 4 mg twice a day  I advised the patient to see a family doctor regularly in [place] for follow-up  When he returns to this area in September, he needs to contact me for an appointment  He recently had his dilated eye exam   There was no evidence of diabetic retinopathy  However, he does have cataracts  They require surgical intervention  He plans to have this done in [place]  Certainly, the patient's fragmented care has made it more difficult to control is diabetes  Ultimately, I would like to see him off p o  Glipizide own and started on some other medication, perhaps Tradjenta or Trulicity  Regardless, this is the only change that I have made for now  He will have to follow-up with someone in [place]

## 2020-03-30 DIAGNOSIS — F41.9 ANXIETY: ICD-10-CM

## 2020-03-31 RX ORDER — ALPRAZOLAM 0.5 MG/1
0.5 TABLET ORAL 3 TIMES DAILY PRN
Qty: 30 TABLET | Refills: 2 | Status: SHIPPED | OUTPATIENT
Start: 2020-03-31 | End: 2020-06-24 | Stop reason: SDUPTHER

## 2020-03-31 NOTE — PROGRESS NOTES
The [place] prescription drug monitoring program was queried  There were no red flags  Safe to proceed

## 2020-04-09 DIAGNOSIS — I10 HYPERTENSION, UNSPECIFIED TYPE: ICD-10-CM

## 2020-04-09 RX ORDER — LISINOPRIL 40 MG/1
40 TABLET ORAL DAILY
Qty: 30 TABLET | Refills: 5 | Status: SHIPPED | OUTPATIENT
Start: 2020-04-09 | End: 2020-11-16

## 2020-06-24 DIAGNOSIS — F41.9 ANXIETY: ICD-10-CM

## 2020-06-24 RX ORDER — ALPRAZOLAM 0.5 MG/1
0.5 TABLET ORAL 3 TIMES DAILY PRN
Qty: 30 TABLET | Refills: 2 | Status: SHIPPED | OUTPATIENT
Start: 2020-06-24 | End: 2020-09-23

## 2020-09-23 DIAGNOSIS — F41.9 ANXIETY: ICD-10-CM

## 2020-09-23 RX ORDER — ALPRAZOLAM 0.5 MG/1
0.5 TABLET ORAL 3 TIMES DAILY PRN
Qty: 30 TABLET | Refills: 2 | Status: SHIPPED | OUTPATIENT
Start: 2020-09-23 | End: 2020-12-15

## 2020-10-05 DIAGNOSIS — E11.65 UNCONTROLLED TYPE 2 DIABETES MELLITUS WITH HYPERGLYCEMIA (HCC): ICD-10-CM

## 2020-11-14 DIAGNOSIS — I10 HYPERTENSION, UNSPECIFIED TYPE: ICD-10-CM

## 2020-11-16 RX ORDER — LISINOPRIL 40 MG/1
TABLET ORAL
Qty: 90 TABLET | Refills: 0 | Status: SHIPPED | OUTPATIENT
Start: 2020-11-16 | End: 2021-03-01

## 2020-12-15 DIAGNOSIS — F41.9 ANXIETY: ICD-10-CM

## 2020-12-15 RX ORDER — ALPRAZOLAM 0.5 MG/1
0.5 TABLET ORAL 3 TIMES DAILY PRN
Qty: 30 TABLET | Refills: 0 | Status: SHIPPED | OUTPATIENT
Start: 2020-12-15 | End: 2021-01-08

## 2021-01-05 DIAGNOSIS — E11.65 UNCONTROLLED TYPE 2 DIABETES MELLITUS WITH HYPERGLYCEMIA (HCC): ICD-10-CM

## 2021-01-08 DIAGNOSIS — F41.9 ANXIETY: ICD-10-CM

## 2021-01-08 RX ORDER — ALPRAZOLAM 0.5 MG/1
0.5 TABLET ORAL 3 TIMES DAILY PRN
Qty: 30 TABLET | Refills: 0 | Status: SHIPPED | OUTPATIENT
Start: 2021-01-08 | End: 2021-02-01 | Stop reason: SDUPTHER

## 2021-01-08 NOTE — TELEPHONE ENCOUNTER
I got a prescription request for this patient to be sent to [facility] in [place]  I was under the impression that he was in [place]  He hasn't been here in a year and is diabetic  He needs an appointment

## 2021-01-19 DIAGNOSIS — E11.65 UNCONTROLLED TYPE 2 DIABETES MELLITUS WITH HYPERGLYCEMIA (HCC): ICD-10-CM

## 2021-01-24 DIAGNOSIS — F41.9 ANXIETY: ICD-10-CM

## 2021-01-28 RX ORDER — ALPRAZOLAM 0.5 MG/1
0.5 TABLET ORAL 3 TIMES DAILY PRN
Qty: 30 TABLET | Refills: 0 | OUTPATIENT
Start: 2021-01-28

## 2021-01-29 DIAGNOSIS — F41.9 ANXIETY: ICD-10-CM

## 2021-01-29 RX ORDER — ALPRAZOLAM 0.5 MG/1
0.5 TABLET ORAL 3 TIMES DAILY PRN
Qty: 30 TABLET | Refills: 0 | OUTPATIENT
Start: 2021-01-29

## 2021-01-31 ENCOUNTER — TELEPHONE (OUTPATIENT)
Dept: FAMILY MEDICINE CLINIC | Facility: CLINIC | Age: 71
End: 2021-01-31

## 2021-01-31 NOTE — TELEPHONE ENCOUNTER
Spoke with patient regarding his appointment for Feb 1  Patient states that he went for his Alprazolam script at [facility] and it was not there  Patient requesting refill be sent to [facility]

## 2021-02-01 DIAGNOSIS — F41.9 ANXIETY: ICD-10-CM

## 2021-02-01 RX ORDER — ALPRAZOLAM 0.5 MG/1
0.5 TABLET ORAL 3 TIMES DAILY PRN
Qty: 30 TABLET | Refills: 0 | Status: SHIPPED | OUTPATIENT
Start: 2021-02-01 | End: 2021-02-22

## 2021-02-22 ENCOUNTER — OFFICE VISIT (OUTPATIENT)
Dept: FAMILY MEDICINE CLINIC | Facility: CLINIC | Age: 71
End: 2021-02-22
Payer: MEDICARE

## 2021-02-22 VITALS
WEIGHT: 259.3 LBS | HEIGHT: 72 IN | SYSTOLIC BLOOD PRESSURE: 138 MMHG | TEMPERATURE: 98.8 F | OXYGEN SATURATION: 97 % | BODY MASS INDEX: 35.12 KG/M2 | DIASTOLIC BLOOD PRESSURE: 80 MMHG | HEART RATE: 93 BPM

## 2021-02-22 DIAGNOSIS — E11.65 UNCONTROLLED TYPE 2 DIABETES MELLITUS WITH HYPERGLYCEMIA (HCC): Primary | ICD-10-CM

## 2021-02-22 DIAGNOSIS — I10 ESSENTIAL HYPERTENSION: ICD-10-CM

## 2021-02-22 DIAGNOSIS — E78.2 MIXED HYPERLIPIDEMIA: ICD-10-CM

## 2021-02-22 DIAGNOSIS — E66.01 OBESITY, MORBID (HCC): ICD-10-CM

## 2021-02-22 DIAGNOSIS — Z00.00 ENCOUNTER FOR MEDICARE ANNUAL WELLNESS EXAM: ICD-10-CM

## 2021-02-22 DIAGNOSIS — F41.9 ANXIETY: ICD-10-CM

## 2021-02-22 DIAGNOSIS — Z12.5 PROSTATE CANCER SCREENING: ICD-10-CM

## 2021-02-22 DIAGNOSIS — Z11.59 ENCOUNTER FOR HEPATITIS C SCREENING TEST FOR LOW RISK PATIENT: ICD-10-CM

## 2021-02-22 DIAGNOSIS — Z79.899 ENCOUNTER FOR LONG-TERM (CURRENT) USE OF MEDICATIONS: ICD-10-CM

## 2021-02-22 PROCEDURE — 99214 OFFICE O/P EST MOD 30 MIN: CPT | Performed by: FAMILY MEDICINE

## 2021-02-22 PROCEDURE — G0439 PPPS, SUBSEQ VISIT: HCPCS | Performed by: FAMILY MEDICINE

## 2021-02-22 PROCEDURE — 1123F ACP DISCUSS/DSCN MKR DOCD: CPT | Performed by: FAMILY MEDICINE

## 2021-02-22 RX ORDER — ALPRAZOLAM 0.5 MG/1
0.5 TABLET ORAL 3 TIMES DAILY PRN
Qty: 30 TABLET | Refills: 0 | Status: SHIPPED | OUTPATIENT
Start: 2021-02-22 | End: 2021-03-23 | Stop reason: SDUPTHER

## 2021-02-22 NOTE — PATIENT INSTRUCTIONS

## 2021-02-22 NOTE — ASSESSMENT & PLAN NOTE
Patient has hypertension  Blood pressure is under fair control  He is going to continue lisinopril 20 mg daily  I encouraged the patient to continue to try to lose weight  He needs to try to walk regularly for exercise as well

## 2021-02-22 NOTE — PROGRESS NOTES
Assessment/Plan:    Uncontrolled type 2 diabetes mellitus with hyperglycemia (HCC)    Lab Results   Component Value Date    HGBA1C 8 5 (H) 01/13/2020     Patient has type 2 diabetes mellitus  Last hemoglobin A1c indicated poor glycemic control  This was  Over 1 year ago  Patient needs closer follow-up for his diabetes  I told him that with his hemoglobin A1c, he should be checked 3-4 times per year  He is at risk for complications of diabetes  He did lose 7 lb since his last visit  I ordered fasting blood work  We discussed routine diabetic foot care  He is going to contact me with the name of his ophthalmologist so that I can get a copy of his dilated eye exam   I will make further recommendations regarding glycemic control when I get his blood work we back  He is going to be going back to [place] for an unspecified period of time  It is going to be difficult to manage this patient's blood sugars  Hypertension    Patient has hypertension  Blood pressure is under fair control  He is going to continue lisinopril 20 mg daily  I encouraged the patient to continue to try to lose weight  He needs to try to walk regularly for exercise as well  Hyperlipidemia    A fasting lipid panel has been ordered  His goal LDL cholesterol is less than 70  He will continue atorvastatin 40 mg daily    Anxiety   Patient reports the death of 2 close friends and his brother-in-law all within the past couple of months  He tells me there were all younger than him, which also bothers him  He will continue alprazolam p r n  Diagnoses and all orders for this visit:    Uncontrolled type 2 diabetes mellitus with hyperglycemia ([facility] [address] )  -     Comprehensive metabolic panel; Future  -     Hemoglobin A1C  -     Microalbumin / creatinine urine ratio    Obesity, morbid (HCC)    Prostate cancer screening  -     PSA, Total Screen;  Future    Encounter for hepatitis C screening test for low risk patient  -     Hepatitis C antibody; Future    Mixed hyperlipidemia  -     Lipid panel; Future    Encounter for long-term (current) use of medications  -     CBC and differential; Future    Essential hypertension    Anxiety    Encounter for Medicare annual wellness exam        The [place] prescription drug monitoring program was queried  There were no red flags  Safe to proceed  Subjective:      Patient ID: Luz Ruiz is a 73 y o  male  This patient is a 72-year-old white male who presents to the office today for his regular checkup as well as for his annual Medicare wellness exam   The patient resides in [place] for approximately 1/2 year and resides in [place] for the other half of the year  He lives with his in-laws in [place]  He tells me he no longer owns property in [place]  He has been back in [place] since October  I only learned of this recently when his pharmacy called for a refill for his alprazolam   Of note, the patient is planning on going back to [place] within the next few days  He is driving  He plans on being there by March 4  He has a known history of diabetes  He tells me he has been checking his blood sugars regularly  He tells me they average between 110 and 122  He tells me he had a he eye exam by Dr Miranda Ya in [place]  She found he had cataracts  While he was in [place], he had cataract surgery on both eyes  He does not know the name of the ophthalmologist   He tells me he had many colonoscopies  His last colonoscopy was 2 years ago, also in [place]  He does not know the name of the gastroenterologist       The following portions of the patient's history were reviewed and updated as appropriate: allergies, current medications, past family history, past medical history, past social history, past surgical history and problem list     Review of Systems   Constitutional: Negative for activity change, appetite change and unexpected weight change     Eyes: Negative for redness and visual disturbance  Respiratory: Negative for cough, shortness of breath and wheezing  Cardiovascular: Negative for chest pain, palpitations and leg swelling  Gastrointestinal: Negative for abdominal distention, abdominal pain, blood in stool, constipation, diarrhea, nausea and vomiting  Genitourinary: Negative for difficulty urinating  Reports occasional nocturia  Denies weak urinary stream         Objective:      /80 (BP Location: Left arm, Patient Position: Sitting, Cuff Size: Large)   Pulse 93   Temp 98 8 °F (37 1 °C) (Tympanic)   Ht 6' (1 829 m)   Wt 118 kg (259 lb 4 8 oz)   SpO2 97%   BMI 35 17 kg/m²          Physical Exam  Vitals signs reviewed  Constitutional:       Comments: This is a 72-year-old white male who appears his stated age  He is pleasant, cooperative, and in no distress  HENT:      Head: Normocephalic and atraumatic  Right Ear: Tympanic membrane, ear canal and external ear normal  There is no impacted cerumen  Left Ear: Tympanic membrane, ear canal and external ear normal  There is no impacted cerumen  Mouth/Throat:      Mouth: Mucous membranes are moist       Pharynx: Oropharynx is clear  No oropharyngeal exudate  Eyes:      General: No scleral icterus  Right eye: No discharge  Left eye: No discharge  Conjunctiva/sclera: Conjunctivae normal       Pupils: Pupils are equal, round, and reactive to light  Neck:      Musculoskeletal: Neck supple  Vascular: No carotid bruit  Comments: There was no thyromegaly  Cardiovascular:      Rate and Rhythm: Normal rate and regular rhythm  Pulses: Pulses are weak  Dorsalis pedis pulses are 0 on the right side and 0 on the left side  Posterior tibial pulses are 0 on the right side and 0 on the left side  Heart sounds: Normal heart sounds  No murmur  No friction rub  No gallop      Pulmonary:      Effort: Pulmonary effort is normal  No respiratory distress  Breath sounds: Normal breath sounds  No stridor  No wheezing, rhonchi or rales  Abdominal:      General: Bowel sounds are normal  There is no distension  Palpations: Abdomen is soft  There is no mass  Tenderness: There is no abdominal tenderness  There is no guarding  Comments: Abdomen is obese  There was no hepatosplenomegaly   Musculoskeletal:      Right lower leg: No edema  Left lower leg: No edema  Feet:      Right foot:      Skin integrity: No ulcer, skin breakdown, erythema, warmth, callus or dry skin  Left foot:      Skin integrity: No ulcer, skin breakdown, erythema, warmth, callus or dry skin  Lymphadenopathy:      Cervical: No cervical adenopathy  Psychiatric:         Mood and Affect: Mood normal          Behavior: Behavior normal          Thought Content: Thought content normal          Judgment: Judgment normal          Patient's shoes and socks removed  Right Foot/Ankle   Right Foot Inspection  Skin Exam: skin normal and skin intact no dry skin, no warmth, no callus, no erythema, no maceration, no abnormal color, no pre-ulcer, no ulcer and no callus                          Toe Exam: no swelling, no tenderness, erythema and  no right toe deformity  Sensory   Vibration: intact  Proprioception: intact   Monofilament testing: intact  Vascular  Capillary refills: < 3 seconds  The right DP pulse is 0  The right PT pulse is 0  Left Foot/Ankle  Left Foot Inspection  Skin Exam: skin normal and skin intactno dry skin, no warmth, no erythema, no maceration, normal color, no pre-ulcer, no ulcer and no callus                         Toe Exam: no swelling, no tenderness, no erythema and no left toe deformity                   Sensory   Vibration: intact  Proprioception: intact  Monofilament: intact  Vascular  Capillary refills: < 3 seconds  The left DP pulse is 0  The left PT pulse is 0  Assign Risk Category:  No deformity present;  No loss of protective sensation; Weak pulses       Risk: 0

## 2021-02-22 NOTE — PROGRESS NOTES
Assessment and Plan:     Problem List Items Addressed This Visit     None        BMI Counseling: Body mass index is 35 17 kg/m²  The BMI is above normal  Nutrition recommendations include decreasing portion sizes, encouraging healthy choices of fruits and vegetables, decreasing fast food intake, limiting drinks that contain sugar and moderation in carbohydrate intake  Exercise recommendations include exercising 3-5 times per week  Preventive health issues were discussed with patient, and age appropriate screening tests were ordered as noted in patient's After Visit Summary  Personalized health advice and appropriate referrals for health education or preventive services given if needed, as noted in patient's After Visit Summary  History of Present Illness:     Patient presents for Medicare Annual Wellness visit    Patient Care Team:  Jose Quezada DO as PCP - General (Family Medicine)     Problem List:     Patient Active Problem List   Diagnosis    Uncontrolled type 2 diabetes mellitus with hyperglycemia ([facility] [address] )    Hyperlipidemia    Hypertension    Anxiety    Nocturia    Encounter for Medicare annual wellness exam      Past Medical and Surgical History:     Past Medical History:   Diagnosis Date    Hyperlipidemia     last assessed: 12/31/2018    Hypertension     last assessed: 12/31/2018    Osteoarthritis     last assessed: 12/31/2018    Type 2 diabetes mellitus ([facility] [address] )     last assessed: 12/31/2018     History reviewed  No pertinent surgical history     Family History:     Family History   Problem Relation Age of Onset   Chelsea Mckeonry Parkinsonism Mother     Breast cancer Sister       Social History:     E-Cigarette/Vaping    E-Cigarette Use Never User      E-Cigarette/Vaping Substances    Nicotine No     THC No     CBD No     Flavoring No     Other No     Unknown No      Social History     Socioeconomic History    Marital status:      Spouse name: None    Number of children: None    Years of education: None    Highest education level: None   Occupational History    None   Social Needs    Financial resource strain: None    Food insecurity     Worry: None     Inability: None    Transportation needs     Medical: None     Non-medical: None   Tobacco Use    Smoking status: Former Smoker     Packs/day: 1 00     Years: 2 00     Pack years: 2 00     Types: Cigarettes     Start date: 38     Quit date:      Years since quittin 1    Smokeless tobacco: Never Used   Substance and Sexual Activity    Alcohol use: Not Currently     Frequency: Monthly or less     Drinks per session: 1 or 2     Binge frequency: Never    Drug use: Never    Sexual activity: None   Lifestyle    Physical activity     Days per week: None     Minutes per session: None    Stress: None   Relationships    Social connections     Talks on phone: None     Gets together: None     Attends Yazidism service: None     Active member of club or organization: None     Attends meetings of clubs or organizations: None     Relationship status: None    Intimate partner violence     Fear of current or ex partner: None     Emotionally abused: None     Physically abused: None     Forced sexual activity: None   Other Topics Concern    None   Social History Narrative    None      Medications and Allergies:     Current Outpatient Medications   Medication Sig Dispense Refill    ALPRAZolam (XANAX) 0 5 mg tablet Take 1 tablet (0 5 mg total) by mouth 3 (three) times a day as needed for anxiety 30 tablet 0    atorvastatin (LIPITOR) 40 mg tablet Take 1 tablet (40 mg total) by mouth daily 90 tablet 3    glimepiride (AMARYL) 4 mg tablet Take 1 tablet (4 mg total) by mouth 2 (two) times a day 180 tablet 2    lisinopril (ZESTRIL) 40 mg tablet TAKE 1 TABLET BY MOUTH EVERY DAY 90 tablet 0    metFORMIN (GLUCOPHAGE) 1000 MG tablet TAKE 1 TABLET BY MOUTH TWICE A  tablet 0    Multiple Vitamins-Minerals (CENTRUM SILVER 50+MEN) TABS Take 1 tablet by mouth daily      pioglitazone (ACTOS) 15 mg tablet Take 1 tablet by mouth daily  0    traZODone (DESYREL) 50 mg tablet Take 1 tablet by mouth 2 (two) times a day       No current facility-administered medications for this visit  No Known Allergies   Immunizations:     Immunization History   Administered Date(s) Administered    Tdap 08/16/2016      Health Maintenance:         Topic Date Due    Hepatitis C Screening  1950    Colorectal Cancer Screening  02/02/2002         Topic Date Due    Pneumococcal Vaccine: 65+ Years (1 of 1 - PPSV23) 02/02/2017    Influenza Vaccine (1) 09/01/2020      Medicare Health Risk Assessment:     /80 (BP Location: Left arm, Patient Position: Sitting, Cuff Size: Large)   Pulse 93   Temp 98 8 °F (37 1 °C) (Tympanic)   Ht 6' (1 829 m)   Wt 118 kg (259 lb 4 8 oz)   SpO2 97%   BMI 35 17 kg/m²      Jorge L Ferrari is here for his Subsequent Wellness visit  Last Medicare Wellness visit information reviewed, patient interviewed and updates made to the record today  Health Risk Assessment:   Patient rates overall health as good  Patient feels that their physical health rating is same  Eyesight was rated as same  Hearing was rated as same  Patient feels that their emotional and mental health rating is same  Pain experienced in the last 7 days has been none  Patient states that he has experienced no weight loss or gain in last 6 months  Depression Screening:   PHQ-2 Score: 1      Fall Risk Screening: In the past year, patient has experienced: no history of falling in past year      Home Safety:  Patient does not have trouble with stairs inside or outside of their home  Patient has working smoke alarms and has working carbon monoxide detector  Home safety hazards include: none  Nutrition:   Current diet is Regular  Knows he is supposed to be on a diabetic diet  Medications:   Patient is currently taking over-the-counter supplements   OTC medications include: see medication list  Patient is able to manage medications  Activities of Daily Living (ADLs)/Instrumental Activities of Daily Living (IADLs):   Walk and transfer into and out of bed and chair?: Yes  Dress and groom yourself?: Yes    Bathe or shower yourself?: Yes    Feed yourself? Yes  Do your laundry/housekeeping?: Yes  Manage your money, pay your bills and track your expenses?: Yes  Make your own meals?: Yes    Do your own shopping?: Yes    Previous Hospitalizations:   Any hospitalizations or ED visits within the last 12 months?: No      Advance Care Planning:   Living will: Yes    Durable POA for healthcare:  Yes    Advanced directive: Yes      Cognitive Screening:   Provider or family/friend/caregiver concerned regarding cognition?: No    PREVENTIVE SCREENINGS      Cardiovascular Screening:    General: Screening Not Indicated and History Lipid Disorder    Due for: Lipid Panel      Diabetes Screening:     General: Screening Not Indicated and History Diabetes    Due for: Blood Glucose      Colorectal Cancer Screening:     General: Risks and Benefits Discussed and Screening Current      Prostate Cancer Screening:      Due for: PSA      Osteoporosis Screening:    General: Screening Not Indicated      Abdominal Aortic Aneurysm (AAA) Screening:    Risk factors include: age between 73-69 yo and tobacco use        General: Risks and Benefits Discussed and Patient Declines      Lung Cancer Screening:     General: Screening Not Indicated      Hepatitis C Screening:    General: Risks and Benefits Discussed    Hep C Screening Accepted: Yes        Radha Talamantes DO

## 2021-02-22 NOTE — ASSESSMENT & PLAN NOTE
Patient reports the death of 2 close friends and his brother-in-law all within the past couple of months  He tells me there were all younger than him, which also bothers him  He will continue alprazolam p r n

## 2021-02-22 NOTE — ASSESSMENT & PLAN NOTE
Lab Results   Component Value Date    HGBA1C 8 5 (H) 01/13/2020     Patient has type 2 diabetes mellitus  Last hemoglobin A1c indicated poor glycemic control  This was  Over 1 year ago  Patient needs closer follow-up for his diabetes  I told him that with his hemoglobin A1c, he should be checked 3-4 times per year  He is at risk for complications of diabetes  He did lose 7 lb since his last visit  I ordered fasting blood work  We discussed routine diabetic foot care  He is going to contact me with the name of his ophthalmologist so that I can get a copy of his dilated eye exam   I will make further recommendations regarding glycemic control when I get his blood work we back  He is going to be going back to [place] for an unspecified period of time  It is going to be difficult to manage this patient's blood sugars

## 2021-02-22 NOTE — ASSESSMENT & PLAN NOTE
A fasting lipid panel has been ordered  His goal LDL cholesterol is less than 70   He will continue atorvastatin 40 mg daily

## 2021-03-01 ENCOUNTER — LAB (OUTPATIENT)
Dept: LAB | Facility: MEDICAL CENTER | Age: 71
End: 2021-03-01
Payer: MEDICARE

## 2021-03-01 DIAGNOSIS — Z79.899 ENCOUNTER FOR LONG-TERM (CURRENT) USE OF MEDICATIONS: ICD-10-CM

## 2021-03-01 DIAGNOSIS — Z11.59 ENCOUNTER FOR HEPATITIS C SCREENING TEST FOR LOW RISK PATIENT: ICD-10-CM

## 2021-03-01 DIAGNOSIS — E78.2 MIXED HYPERLIPIDEMIA: ICD-10-CM

## 2021-03-01 DIAGNOSIS — Z12.5 PROSTATE CANCER SCREENING: ICD-10-CM

## 2021-03-01 DIAGNOSIS — E11.65 UNCONTROLLED TYPE 2 DIABETES MELLITUS WITH HYPERGLYCEMIA (HCC): ICD-10-CM

## 2021-03-01 DIAGNOSIS — I10 HYPERTENSION, UNSPECIFIED TYPE: ICD-10-CM

## 2021-03-01 LAB
ALBUMIN SERPL BCP-MCNC: 3.9 G/DL (ref 3.5–5)
ALP SERPL-CCNC: 95 U/L (ref 46–116)
ALT SERPL W P-5'-P-CCNC: 45 U/L (ref 12–78)
ANION GAP SERPL CALCULATED.3IONS-SCNC: 7 MMOL/L (ref 4–13)
AST SERPL W P-5'-P-CCNC: 22 U/L (ref 5–45)
BASOPHILS # BLD AUTO: 0.04 THOUSANDS/ΜL (ref 0–0.1)
BASOPHILS NFR BLD AUTO: 0 % (ref 0–1)
BILIRUB SERPL-MCNC: 0.32 MG/DL (ref 0.2–1)
BUN SERPL-MCNC: 38 MG/DL (ref 5–25)
CALCIUM SERPL-MCNC: 9.5 MG/DL (ref 8.3–10.1)
CHLORIDE SERPL-SCNC: 110 MMOL/L (ref 100–108)
CHOLEST SERPL-MCNC: 149 MG/DL (ref 50–200)
CO2 SERPL-SCNC: 23 MMOL/L (ref 21–32)
CREAT SERPL-MCNC: 1.85 MG/DL (ref 0.6–1.3)
CREAT UR-MCNC: 209 MG/DL
EOSINOPHIL # BLD AUTO: 0.36 THOUSAND/ΜL (ref 0–0.61)
EOSINOPHIL NFR BLD AUTO: 3 % (ref 0–6)
ERYTHROCYTE [DISTWIDTH] IN BLOOD BY AUTOMATED COUNT: 14.3 % (ref 11.6–15.1)
EST. AVERAGE GLUCOSE BLD GHB EST-MCNC: 220 MG/DL
GFR SERPL CREATININE-BSD FRML MDRD: 36 ML/MIN/1.73SQ M
GLUCOSE P FAST SERPL-MCNC: 155 MG/DL (ref 65–99)
HBA1C MFR BLD: 9.3 %
HCT VFR BLD AUTO: 38.5 % (ref 36.5–49.3)
HCV AB SER QL: NORMAL
HDLC SERPL-MCNC: 50 MG/DL
HGB BLD-MCNC: 11.9 G/DL (ref 12–17)
IMM GRANULOCYTES # BLD AUTO: 0.03 THOUSAND/UL (ref 0–0.2)
IMM GRANULOCYTES NFR BLD AUTO: 0 % (ref 0–2)
LDLC SERPL CALC-MCNC: 53 MG/DL (ref 0–100)
LYMPHOCYTES # BLD AUTO: 5.89 THOUSANDS/ΜL (ref 0.6–4.47)
LYMPHOCYTES NFR BLD AUTO: 53 % (ref 14–44)
MCH RBC QN AUTO: 27.9 PG (ref 26.8–34.3)
MCHC RBC AUTO-ENTMCNC: 30.9 G/DL (ref 31.4–37.4)
MCV RBC AUTO: 90 FL (ref 82–98)
MICROALBUMIN UR-MCNC: 16.9 MG/L (ref 0–20)
MICROALBUMIN/CREAT 24H UR: 8 MG/G CREATININE (ref 0–30)
MONOCYTES # BLD AUTO: 0.94 THOUSAND/ΜL (ref 0.17–1.22)
MONOCYTES NFR BLD AUTO: 8 % (ref 4–12)
NEUTROPHILS # BLD AUTO: 4.1 THOUSANDS/ΜL (ref 1.85–7.62)
NEUTS SEG NFR BLD AUTO: 36 % (ref 43–75)
NONHDLC SERPL-MCNC: 99 MG/DL
NRBC BLD AUTO-RTO: 0 /100 WBCS
PLATELET # BLD AUTO: 462 THOUSANDS/UL (ref 149–390)
PMV BLD AUTO: 10.6 FL (ref 8.9–12.7)
POTASSIUM SERPL-SCNC: 5.3 MMOL/L (ref 3.5–5.3)
PROT SERPL-MCNC: 7.4 G/DL (ref 6.4–8.2)
PSA SERPL-MCNC: 2.3 NG/ML (ref 0–4)
RBC # BLD AUTO: 4.26 MILLION/UL (ref 3.88–5.62)
SODIUM SERPL-SCNC: 140 MMOL/L (ref 136–145)
TRIGL SERPL-MCNC: 230 MG/DL
WBC # BLD AUTO: 11.36 THOUSAND/UL (ref 4.31–10.16)

## 2021-03-01 PROCEDURE — 83036 HEMOGLOBIN GLYCOSYLATED A1C: CPT | Performed by: FAMILY MEDICINE

## 2021-03-01 PROCEDURE — 80061 LIPID PANEL: CPT

## 2021-03-01 PROCEDURE — 36415 COLL VENOUS BLD VENIPUNCTURE: CPT | Performed by: FAMILY MEDICINE

## 2021-03-01 PROCEDURE — 82043 UR ALBUMIN QUANTITATIVE: CPT | Performed by: FAMILY MEDICINE

## 2021-03-01 PROCEDURE — 80053 COMPREHEN METABOLIC PANEL: CPT

## 2021-03-01 PROCEDURE — G0103 PSA SCREENING: HCPCS

## 2021-03-01 PROCEDURE — 85025 COMPLETE CBC W/AUTO DIFF WBC: CPT

## 2021-03-01 PROCEDURE — 86803 HEPATITIS C AB TEST: CPT

## 2021-03-01 PROCEDURE — 82570 ASSAY OF URINE CREATININE: CPT | Performed by: FAMILY MEDICINE

## 2021-03-01 RX ORDER — LISINOPRIL 40 MG/1
TABLET ORAL
Qty: 90 TABLET | Refills: 0 | Status: SHIPPED | OUTPATIENT
Start: 2021-03-01 | End: 2021-06-01

## 2021-03-05 DIAGNOSIS — E11.65 UNCONTROLLED TYPE 2 DIABETES MELLITUS WITH HYPERGLYCEMIA (HCC): ICD-10-CM

## 2021-03-05 RX ORDER — GLIMEPIRIDE 4 MG/1
4 TABLET ORAL 2 TIMES DAILY
Qty: 180 TABLET | Refills: 2 | Status: SHIPPED | OUTPATIENT
Start: 2021-03-05 | End: 2022-04-20 | Stop reason: ALTCHOICE

## 2021-03-23 ENCOUNTER — TELEPHONE (OUTPATIENT)
Dept: OTHER | Facility: OTHER | Age: 71
End: 2021-03-23

## 2021-03-23 DIAGNOSIS — F41.9 ANXIETY: ICD-10-CM

## 2021-03-23 RX ORDER — ALPRAZOLAM 0.5 MG/1
0.5 TABLET ORAL 3 TIMES DAILY PRN
Qty: 30 TABLET | Refills: 2 | Status: SHIPPED | OUTPATIENT
Start: 2021-03-23 | End: 2021-03-24 | Stop reason: SDUPTHER

## 2021-03-23 NOTE — TELEPHONE ENCOUNTER
Xanax called in to incorrect pharmacy- wrong pharmacy removed from profile, [place] is out of state on [place] [facility]

## 2021-03-23 NOTE — TELEPHONE ENCOUNTER
PDMP was reviewed no red flags safe to proceed with script  Last refill for 30 tablets was 02/22/21

## 2021-03-24 DIAGNOSIS — F41.9 ANXIETY: ICD-10-CM

## 2021-03-24 RX ORDER — ALPRAZOLAM 0.5 MG/1
0.5 TABLET ORAL 3 TIMES DAILY PRN
Qty: 30 TABLET | Refills: 2 | Status: SHIPPED | OUTPATIENT
Start: 2021-03-24 | End: 2021-06-14

## 2021-03-24 RX ORDER — ALPRAZOLAM 0.5 MG/1
0.5 TABLET ORAL 3 TIMES DAILY PRN
Qty: 30 TABLET | Refills: 2 | OUTPATIENT
Start: 2021-03-24

## 2021-04-07 DIAGNOSIS — E78.5 HYPERLIPIDEMIA, UNSPECIFIED HYPERLIPIDEMIA TYPE: ICD-10-CM

## 2021-04-07 RX ORDER — ATORVASTATIN CALCIUM 40 MG/1
40 TABLET, FILM COATED ORAL DAILY
Qty: 90 TABLET | Refills: 3 | Status: SHIPPED | OUTPATIENT
Start: 2021-04-07 | End: 2022-04-26

## 2021-05-31 DIAGNOSIS — I10 HYPERTENSION, UNSPECIFIED TYPE: ICD-10-CM

## 2021-06-01 RX ORDER — LISINOPRIL 40 MG/1
TABLET ORAL
Qty: 90 TABLET | Refills: 0 | Status: SHIPPED | OUTPATIENT
Start: 2021-06-01 | End: 2021-08-16

## 2021-06-12 DIAGNOSIS — F41.9 ANXIETY: ICD-10-CM

## 2021-06-14 RX ORDER — ALPRAZOLAM 0.5 MG/1
0.5 TABLET ORAL 3 TIMES DAILY PRN
Qty: 30 TABLET | Refills: 2 | Status: SHIPPED | OUTPATIENT
Start: 2021-06-14 | End: 2021-08-19 | Stop reason: SDUPTHER

## 2021-06-21 DIAGNOSIS — E11.65 UNCONTROLLED TYPE 2 DIABETES MELLITUS WITH HYPERGLYCEMIA (HCC): ICD-10-CM

## 2021-06-22 DIAGNOSIS — E11.65 UNCONTROLLED TYPE 2 DIABETES MELLITUS WITH HYPERGLYCEMIA (HCC): Primary | ICD-10-CM

## 2021-06-22 RX ORDER — PIOGLITAZONEHYDROCHLORIDE 15 MG/1
15 TABLET ORAL DAILY
Qty: 90 TABLET | Refills: 1 | Status: SHIPPED | OUTPATIENT
Start: 2021-06-22 | End: 2022-04-20 | Stop reason: DRUGHIGH

## 2021-08-14 DIAGNOSIS — I10 HYPERTENSION, UNSPECIFIED TYPE: ICD-10-CM

## 2021-08-16 RX ORDER — LISINOPRIL 40 MG/1
TABLET ORAL
Qty: 90 TABLET | Refills: 0 | Status: SHIPPED | OUTPATIENT
Start: 2021-08-16 | End: 2022-04-20 | Stop reason: ALTCHOICE

## 2021-08-19 DIAGNOSIS — F41.9 ANXIETY: ICD-10-CM

## 2021-08-19 RX ORDER — ALPRAZOLAM 0.5 MG/1
0.5 TABLET ORAL 3 TIMES DAILY PRN
Qty: 30 TABLET | Refills: 2 | Status: SHIPPED | OUTPATIENT
Start: 2021-08-19 | End: 2021-11-04

## 2021-09-28 DIAGNOSIS — E11.65 UNCONTROLLED TYPE 2 DIABETES MELLITUS WITH HYPERGLYCEMIA (HCC): ICD-10-CM

## 2021-11-04 DIAGNOSIS — F41.9 ANXIETY: ICD-10-CM

## 2021-11-04 RX ORDER — ALPRAZOLAM 0.5 MG/1
0.5 TABLET ORAL 3 TIMES DAILY PRN
Qty: 30 TABLET | Refills: 2 | Status: SHIPPED | OUTPATIENT
Start: 2021-11-04 | End: 2022-01-18 | Stop reason: SDUPTHER

## 2021-12-20 DIAGNOSIS — E11.65 UNCONTROLLED TYPE 2 DIABETES MELLITUS WITH HYPERGLYCEMIA (HCC): ICD-10-CM

## 2022-01-18 DIAGNOSIS — F41.9 ANXIETY: ICD-10-CM

## 2022-01-18 RX ORDER — ALPRAZOLAM 0.5 MG/1
0.5 TABLET ORAL 3 TIMES DAILY PRN
Qty: 30 TABLET | Refills: 2 | Status: SHIPPED | OUTPATIENT
Start: 2022-01-18 | End: 2022-04-06 | Stop reason: SDUPTHER

## 2022-01-18 NOTE — TELEPHONE ENCOUNTER
Looks like Latrice La is back in town  He needs an appointment and blood work   Have him schedule before he goes back to [place] again

## 2022-01-20 ENCOUNTER — TELEPHONE (OUTPATIENT)
Dept: FAMILY MEDICINE CLINIC | Facility: CLINIC | Age: 72
End: 2022-01-20

## 2022-03-16 DIAGNOSIS — E11.65 UNCONTROLLED TYPE 2 DIABETES MELLITUS WITH HYPERGLYCEMIA (HCC): ICD-10-CM

## 2022-04-06 DIAGNOSIS — E11.65 UNCONTROLLED TYPE 2 DIABETES MELLITUS WITH HYPERGLYCEMIA (HCC): ICD-10-CM

## 2022-04-06 DIAGNOSIS — F41.9 ANXIETY: ICD-10-CM

## 2022-04-06 NOTE — TELEPHONE ENCOUNTER
Pt called, requesting refill for the following that was selected   Pharmacy information was verified and confirmed

## 2022-04-07 ENCOUNTER — TELEPHONE (OUTPATIENT)
Dept: FAMILY MEDICINE CLINIC | Facility: CLINIC | Age: 72
End: 2022-04-07

## 2022-04-07 DIAGNOSIS — Z12.5 PROSTATE CANCER SCREENING: ICD-10-CM

## 2022-04-07 DIAGNOSIS — E78.2 MIXED HYPERLIPIDEMIA: ICD-10-CM

## 2022-04-07 DIAGNOSIS — E11.65 UNCONTROLLED TYPE 2 DIABETES MELLITUS WITH HYPERGLYCEMIA (HCC): Primary | ICD-10-CM

## 2022-04-07 DIAGNOSIS — Z79.899 ENCOUNTER FOR LONG-TERM (CURRENT) USE OF MEDICATIONS: ICD-10-CM

## 2022-04-07 RX ORDER — ALPRAZOLAM 0.5 MG/1
0.5 TABLET ORAL 3 TIMES DAILY PRN
Qty: 30 TABLET | Refills: 0 | Status: SHIPPED | OUTPATIENT
Start: 2022-04-07 | End: 2022-05-02 | Stop reason: SDUPTHER

## 2022-04-18 ENCOUNTER — APPOINTMENT (OUTPATIENT)
Dept: LAB | Facility: MEDICAL CENTER | Age: 72
End: 2022-04-18
Payer: MEDICARE

## 2022-04-18 DIAGNOSIS — Z12.5 PROSTATE CANCER SCREENING: ICD-10-CM

## 2022-04-18 DIAGNOSIS — Z79.899 ENCOUNTER FOR LONG-TERM (CURRENT) USE OF MEDICATIONS: ICD-10-CM

## 2022-04-18 DIAGNOSIS — E78.2 MIXED HYPERLIPIDEMIA: ICD-10-CM

## 2022-04-18 DIAGNOSIS — E11.65 UNCONTROLLED TYPE 2 DIABETES MELLITUS WITH HYPERGLYCEMIA (HCC): ICD-10-CM

## 2022-04-18 LAB
ALBUMIN SERPL BCP-MCNC: 3.9 G/DL (ref 3.5–5)
ALP SERPL-CCNC: 87 U/L (ref 46–116)
ALT SERPL W P-5'-P-CCNC: 52 U/L (ref 12–78)
ANION GAP SERPL CALCULATED.3IONS-SCNC: 0 MMOL/L (ref 4–13)
AST SERPL W P-5'-P-CCNC: 29 U/L (ref 5–45)
BASOPHILS # BLD AUTO: 0.02 THOUSANDS/ΜL (ref 0–0.1)
BASOPHILS NFR BLD AUTO: 0 % (ref 0–1)
BILIRUB SERPL-MCNC: 0.5 MG/DL (ref 0.2–1)
BUN SERPL-MCNC: 13 MG/DL (ref 5–25)
CALCIUM SERPL-MCNC: 10 MG/DL (ref 8.3–10.1)
CHLORIDE SERPL-SCNC: 105 MMOL/L (ref 100–108)
CHOLEST SERPL-MCNC: 147 MG/DL
CO2 SERPL-SCNC: 32 MMOL/L (ref 21–32)
CREAT SERPL-MCNC: 1.34 MG/DL (ref 0.6–1.3)
CREAT UR-MCNC: 209 MG/DL
EOSINOPHIL # BLD AUTO: 0.16 THOUSAND/ΜL (ref 0–0.61)
EOSINOPHIL NFR BLD AUTO: 2 % (ref 0–6)
ERYTHROCYTE [DISTWIDTH] IN BLOOD BY AUTOMATED COUNT: 14.7 % (ref 11.6–15.1)
EST. AVERAGE GLUCOSE BLD GHB EST-MCNC: 166 MG/DL
GFR SERPL CREATININE-BSD FRML MDRD: 53 ML/MIN/1.73SQ M
GLUCOSE P FAST SERPL-MCNC: 144 MG/DL (ref 65–99)
HBA1C MFR BLD: 7.4 %
HCT VFR BLD AUTO: 40.3 % (ref 36.5–49.3)
HDLC SERPL-MCNC: 65 MG/DL
HGB BLD-MCNC: 12.8 G/DL (ref 12–17)
IMM GRANULOCYTES # BLD AUTO: 0.03 THOUSAND/UL (ref 0–0.2)
IMM GRANULOCYTES NFR BLD AUTO: 0 % (ref 0–2)
LDLC SERPL CALC-MCNC: 63 MG/DL (ref 0–100)
LYMPHOCYTES # BLD AUTO: 2.79 THOUSANDS/ΜL (ref 0.6–4.47)
LYMPHOCYTES NFR BLD AUTO: 32 % (ref 14–44)
MCH RBC QN AUTO: 27.1 PG (ref 26.8–34.3)
MCHC RBC AUTO-ENTMCNC: 31.8 G/DL (ref 31.4–37.4)
MCV RBC AUTO: 85 FL (ref 82–98)
MICROALBUMIN UR-MCNC: 43.4 MG/L (ref 0–20)
MICROALBUMIN/CREAT 24H UR: 21 MG/G CREATININE (ref 0–30)
MONOCYTES # BLD AUTO: 0.57 THOUSAND/ΜL (ref 0.17–1.22)
MONOCYTES NFR BLD AUTO: 7 % (ref 4–12)
NEUTROPHILS # BLD AUTO: 5.12 THOUSANDS/ΜL (ref 1.85–7.62)
NEUTS SEG NFR BLD AUTO: 59 % (ref 43–75)
NONHDLC SERPL-MCNC: 82 MG/DL
NRBC BLD AUTO-RTO: 0 /100 WBCS
PLATELET # BLD AUTO: 437 THOUSANDS/UL (ref 149–390)
PMV BLD AUTO: 10 FL (ref 8.9–12.7)
POTASSIUM SERPL-SCNC: 5.2 MMOL/L (ref 3.5–5.3)
PROT SERPL-MCNC: 7.6 G/DL (ref 6.4–8.2)
PSA SERPL-MCNC: 3.4 NG/ML (ref 0–4)
RBC # BLD AUTO: 4.73 MILLION/UL (ref 3.88–5.62)
SODIUM SERPL-SCNC: 137 MMOL/L (ref 136–145)
TRIGL SERPL-MCNC: 96 MG/DL
WBC # BLD AUTO: 8.69 THOUSAND/UL (ref 4.31–10.16)

## 2022-04-18 PROCEDURE — 85025 COMPLETE CBC W/AUTO DIFF WBC: CPT

## 2022-04-18 PROCEDURE — 80053 COMPREHEN METABOLIC PANEL: CPT

## 2022-04-18 PROCEDURE — 82043 UR ALBUMIN QUANTITATIVE: CPT | Performed by: FAMILY MEDICINE

## 2022-04-18 PROCEDURE — G0103 PSA SCREENING: HCPCS

## 2022-04-18 PROCEDURE — 82570 ASSAY OF URINE CREATININE: CPT | Performed by: FAMILY MEDICINE

## 2022-04-18 PROCEDURE — 83036 HEMOGLOBIN GLYCOSYLATED A1C: CPT

## 2022-04-18 PROCEDURE — 36415 COLL VENOUS BLD VENIPUNCTURE: CPT

## 2022-04-18 PROCEDURE — 80061 LIPID PANEL: CPT

## 2022-04-20 ENCOUNTER — OFFICE VISIT (OUTPATIENT)
Dept: FAMILY MEDICINE CLINIC | Facility: CLINIC | Age: 72
End: 2022-04-20
Payer: MEDICARE

## 2022-04-20 VITALS
BODY MASS INDEX: 36.12 KG/M2 | OXYGEN SATURATION: 94 % | DIASTOLIC BLOOD PRESSURE: 70 MMHG | HEART RATE: 90 BPM | SYSTOLIC BLOOD PRESSURE: 102 MMHG | HEIGHT: 72 IN | WEIGHT: 266.7 LBS | TEMPERATURE: 98.3 F

## 2022-04-20 DIAGNOSIS — Z12.11 COLON CANCER SCREENING: ICD-10-CM

## 2022-04-20 DIAGNOSIS — E78.2 MIXED HYPERLIPIDEMIA: ICD-10-CM

## 2022-04-20 DIAGNOSIS — N18.31 STAGE 3A CHRONIC KIDNEY DISEASE (HCC): ICD-10-CM

## 2022-04-20 DIAGNOSIS — I10 ESSENTIAL HYPERTENSION: ICD-10-CM

## 2022-04-20 DIAGNOSIS — Z00.00 ENCOUNTER FOR MEDICARE ANNUAL WELLNESS EXAM: ICD-10-CM

## 2022-04-20 DIAGNOSIS — E11.9 TYPE 2 DIABETES MELLITUS WITHOUT COMPLICATION, WITHOUT LONG-TERM CURRENT USE OF INSULIN (HCC): Primary | ICD-10-CM

## 2022-04-20 DIAGNOSIS — E66.01 OBESITY, MORBID (HCC): ICD-10-CM

## 2022-04-20 PROBLEM — E11.65 UNCONTROLLED TYPE 2 DIABETES MELLITUS WITH HYPERGLYCEMIA (HCC): Status: RESOLVED | Noted: 2020-01-02 | Resolved: 2022-04-20

## 2022-04-20 PROCEDURE — 99214 OFFICE O/P EST MOD 30 MIN: CPT | Performed by: FAMILY MEDICINE

## 2022-04-20 PROCEDURE — 1123F ACP DISCUSS/DSCN MKR DOCD: CPT | Performed by: FAMILY MEDICINE

## 2022-04-20 PROCEDURE — G0439 PPPS, SUBSEQ VISIT: HCPCS | Performed by: FAMILY MEDICINE

## 2022-04-20 RX ORDER — SEMAGLUTIDE 1.34 MG/ML
1 INJECTION, SOLUTION SUBCUTANEOUS WEEKLY
COMMUNITY
Start: 2022-04-12

## 2022-04-20 RX ORDER — PIOGLITAZONEHYDROCHLORIDE 45 MG/1
45 TABLET ORAL DAILY
COMMUNITY
End: 2022-04-20 | Stop reason: ALTCHOICE

## 2022-04-20 RX ORDER — AMLODIPINE BESYLATE 5 MG/1
5 TABLET ORAL DAILY
COMMUNITY
Start: 2022-02-26

## 2022-04-20 NOTE — PROGRESS NOTES
Assessment/Plan:    Type 2 diabetes mellitus without complication, without long-term current use of insulin ([facility])    Lab Results   Component Value Date    HGBA1C 7 4 (H) 04/18/2022   Patient has type 2 diabetes mellitus  I reviewed his labs  His fasting blood glucose was 144  Hemoglobin A1c is 7 4  Previously, he was 9 3  Since I last saw the patient, he was started on Ozempic and his Amaryl was discontinued  Unfortunately, despite the fact that he started Ozempic, he gained 7 lb  Patient needs to lose weight  I suggested we discontinue his Actos, which may help him lose weight  He was started on Jardiance 10 mg once a day  This should be titrated up to 25 mg daily, depending on his response  While in [place], he did see Ophthalmology and I will try to get a copy of his dilated eye exam   He had cataract surgery while out in [place]  Today, we discussed diabetic foot care  I also told the patient he needs closer follow-up  He needs to notify me when he is in [place] so that we can attend to the patient's needs  Stage 3a chronic kidney disease [facility])  Lab Results   Component Value Date    EGFR 53 04/18/2022    EGFR 36 03/01/2021    EGFR 60 01/09/2020    CREATININE 1 34 (H) 04/18/2022    CREATININE 1 85 (H) 03/01/2021    CREATININE 1 24 01/09/2020   His creatinine was noted to be 1 34 with an estimated GFR of 53  Patient needs to avoid nephrotoxic agents  He may continue metformin at his current dose     Essential hypertension  Patient has hypertension  Blood pressure is well controlled  I recheck his blood pressure myself and found it to be 120/66  He is off lisinopril, likely due to the bump in his creatinine  He is now on amlodipine 5 mg daily  He tells me he feels much better on this medication  Colon cancer screening  Patient is 81years old and has never had a colonoscopy  We discussed colon cancer screening    The patient was agreeable to a fit test and this was ordered  Hyperlipidemia  I reviewed all of patient's labs which consisted of a CBC with diff, hemoglobin A1c, CMP, PSA, and fasting lipid panel  His total cholesterol was 147  Triglycerides were 96  HDL cholesterol 65  LDL cholesterol is 63  His goal is less than 70  He will continue atorvastatin 40 mg daily        Diagnoses and all orders for this visit:    Type 2 diabetes mellitus without complication, without long-term current use of insulin (HCC)  -     Empagliflozin (Jardiance) 10 MG TABS; Take 1 tablet (10 mg total) by mouth every morning    Colon cancer screening  -     Occult Blood, Fecal Immunochemical; Future    Stage 3a chronic kidney disease (HCC)    Obesity, morbid ([facility] [address] )    Essential hypertension    Mixed hyperlipidemia    Encounter for Medicare annual wellness exam    Other orders  -     amLODIPine (NORVASC) 5 mg tablet; Take 5 mg by mouth daily Blood presuure  -     Discontinue: pioglitazone (ACTOS) 45 mg tablet; Take 45 mg by mouth daily  -     Ozempic, 1 MG/DOSE, 4 MG/3ML SOPN injection pen; Inject 1 mg under the skin once a week          Subjective:      Patient ID: Eric Grijalva is a 81 y o  male  Patient is a 81-year-old white male who presents to the office today for his routine checkup as well as his annual Medicare wellness exam   The patient lives in [place] part-time and in [place] part-time  As such, it is very difficult to follow this patient  I have not seen this patient in well over a year  Fortunately, he did see a doctor in [place] since I last saw him  The patient tells me he has been back in [place] since December  Unfortunately, I was not notified and did not learned the patient was in [place] until his local pharmacy contacted me for a refill  The patient was then contacted and asked to make an appointment  The patient tells me he has been working on paperwork for his nephews house  His nephew was tragically killed in a car accident    The patient is the next closest living relative  The following portions of the patient's history were reviewed and updated as appropriate: allergies, current medications, past family history, past medical history, past social history, past surgical history and problem list     Review of Systems   Constitutional: Negative for activity change and appetite change  Eyes: Negative for visual disturbance  Respiratory: Negative for cough, shortness of breath and wheezing  Cardiovascular: Negative for chest pain, palpitations and leg swelling  Gastrointestinal: Negative for abdominal distention, abdominal pain, blood in stool, constipation, diarrhea and nausea  Objective:      /70 (BP Location: Left arm, Patient Position: Sitting, Cuff Size: Large)   Pulse 90   Temp 98 3 °F (36 8 °C) (Tympanic)   Ht 6' (1 829 m)   Wt 121 kg (266 lb 11 2 oz)   SpO2 94%   BMI 36 17 kg/m²          Physical Exam  Vitals reviewed  Constitutional:       Comments: Patient is a 44-year-old white male who appears his stated age  He is obese and in no apparent distress   HENT:      Head: Normocephalic and atraumatic  Right Ear: Tympanic membrane, ear canal and external ear normal  There is no impacted cerumen  Left Ear: Tympanic membrane, ear canal and external ear normal  There is no impacted cerumen  Mouth/Throat:      Mouth: Mucous membranes are moist       Pharynx: Oropharynx is clear  No oropharyngeal exudate or posterior oropharyngeal erythema  Eyes:      General: No scleral icterus  Right eye: No discharge  Left eye: No discharge  Conjunctiva/sclera: Conjunctivae normal       Pupils: Pupils are equal, round, and reactive to light  Cardiovascular:      Rate and Rhythm: Normal rate and regular rhythm  Pulses: no weak pulses          Dorsalis pedis pulses are 2+ on the right side and 2+ on the left side          Posterior tibial pulses are 2+ on the right side and 2+ on the left side  Heart sounds: Murmur (There is a grade 2/6 systolic murmur noted) heard  No friction rub  No gallop  Pulmonary:      Effort: Pulmonary effort is normal  No respiratory distress  Breath sounds: Normal breath sounds  No stridor  No wheezing, rhonchi or rales  Abdominal:      General: Bowel sounds are normal  There is no distension  Palpations: Abdomen is soft  There is no mass  Tenderness: There is no abdominal tenderness  There is no guarding  Musculoskeletal:      Cervical back: Neck supple  Right lower leg: No edema  Left lower leg: No edema  Feet:      Right foot:      Skin integrity: No ulcer, skin breakdown, erythema, warmth, callus or dry skin  Left foot:      Skin integrity: No ulcer, skin breakdown, erythema, warmth, callus or dry skin  Lymphadenopathy:      Cervical: No cervical adenopathy  Psychiatric:         Mood and Affect: Mood normal          Behavior: Behavior normal          Thought Content: Thought content normal          Judgment: Judgment normal          Patient's shoes and socks removed  Right Foot/Ankle   Right Foot Inspection  Skin Exam: skin normal and skin intact  No dry skin, no warmth, no callus, no erythema, no maceration, no abnormal color, no pre-ulcer, no ulcer and no callus  Toe Exam: No swelling, no tenderness, erythema and  no right toe deformity    Sensory   Vibration: intact  Proprioception: intact  Monofilament testing: intact    Vascular  Capillary refills: < 3 seconds  The right DP pulse is 2+  The right PT pulse is 2+  Left Foot/Ankle  Left Foot Inspection  Skin Exam: skin normal and skin intact  No dry skin, no warmth, no erythema, no maceration, normal color, no pre-ulcer, no ulcer and no callus  Toe Exam: No swelling, no tenderness, no erythema and no left toe deformity       Sensory   Vibration: intact  Proprioception: intact  Monofilament testing: intact    Vascular  Capillary refills: < 3 seconds  The left DP pulse is 2+  The left PT pulse is 2+       Assign Risk Category  No deformity present  No loss of protective sensation  No weak pulses  Risk: 0

## 2022-04-20 NOTE — PATIENT INSTRUCTIONS

## 2022-04-20 NOTE — PROGRESS NOTES
Assessment and Plan:     Problem List Items Addressed This Visit        Endocrine    Type 2 diabetes mellitus without complication, without long-term current use of insulin ([facility] [address] ) - Primary       Lab Results   Component Value Date    HGBA1C 7 4 (H) 04/18/2022   Patient has type 2 diabetes mellitus  I reviewed his labs  His fasting blood glucose was 144  Hemoglobin A1c is 7 4  Previously, he was 9 3  Since I last saw the patient, he was started on Ozempic and his Amaryl was discontinued  Unfortunately, despite the fact that he started Ozempic, he gained 7 lb  Patient needs to lose weight  I suggested we discontinue his Actos, which may help him lose weight  He was started on Jardiance 10 mg once a day  This should be titrated up to 25 mg daily, depending on his response  While in [place], he did see Ophthalmology and I will try to get a copy of his dilated eye exam   He had cataract surgery while out in [place]  Today, we discussed diabetic foot care  I also told the patient he needs closer follow-up  He needs to notify me when he is in [place] so that we can attend to the patient's needs  Relevant Medications    Ozempic, 1 MG/DOSE, 4 MG/3ML SOPN injection pen    Empagliflozin (Jardiance) 10 MG TABS       Cardiovascular and Mediastinum    Essential hypertension     Patient has hypertension  Blood pressure is well controlled  I recheck his blood pressure myself and found it to be 120/66  He is off lisinopril, likely due to the bump in his creatinine  He is now on amlodipine 5 mg daily  He tells me he feels much better on this medication           Relevant Medications    amLODIPine (NORVASC) 5 mg tablet       Genitourinary    Stage 3a chronic kidney disease [facility])     Lab Results   Component Value Date    EGFR 53 04/18/2022    EGFR 36 03/01/2021    EGFR 60 01/09/2020    CREATININE 1 34 (H) 04/18/2022    CREATININE 1 85 (H) 03/01/2021    CREATININE 1 24 01/09/2020   His creatinine was noted to be 1 34 with an estimated GFR of 53  Patient needs to avoid nephrotoxic agents  He may continue metformin at his current dose             Other    Hyperlipidemia     I reviewed all of patient's labs which consisted of a CBC with diff, hemoglobin A1c, CMP, PSA, and fasting lipid panel  His total cholesterol was 147  Triglycerides were 96  HDL cholesterol 65  LDL cholesterol is 63  His goal is less than 70  He will continue atorvastatin 40 mg daily          Encounter for Medicare annual wellness exam    Obesity, morbid ([address] )    Colon cancer screening     Patient is 81years old and has never had a colonoscopy  We discussed colon cancer screening  The patient was agreeable to a fit test and this was ordered  Relevant Orders    Occult Blood, Fecal Immunochemical        BMI Counseling: Body mass index is 36 17 kg/m²  The BMI is above normal  Nutrition recommendations include decreasing portion sizes, decreasing fast food intake, consuming healthier snacks and moderation in carbohydrate intake  Exercise recommendations include exercising 3-5 times per week  Rationale for BMI follow-up plan is due to patient being overweight or obese  Depression Screening and Follow-up Plan: Patient was screened for depression during today's encounter  They screened negative with a PHQ-2 score of 0  Preventive health issues were discussed with patient, and age appropriate screening tests were ordered as noted in patient's After Visit Summary  Personalized health advice and appropriate referrals for health education or preventive services given if needed, as noted in patient's After Visit Summary       History of Present Illness:     Patient presents for Medicare Annual Wellness visit    Patient Care Team:  Nola Sheehan DO as PCP - General (Family Medicine)     Problem List:     Patient Active Problem List   Diagnosis    Hyperlipidemia    Essential hypertension    Anxiety    Nocturia    Encounter for Medicare annual wellness exam    Obesity, morbid ([place] [address] )    Encounter for hepatitis C screening test for low risk patient    Prostate cancer screening    Encounter for long-term (current) use of medications    Stage 3a chronic kidney disease ([place] [address] )    Type 2 diabetes mellitus without complication, without long-term current use of insulin ([place] [address] )    Colon cancer screening      Past Medical and Surgical History:     Past Medical History:   Diagnosis Date    Hyperlipidemia     last assessed: 2018    Hypertension     last assessed: 2018    Osteoarthritis     last assessed: 2018    Type 2 diabetes mellitus ([place] [address] )     last assessed: 2018     History reviewed  No pertinent surgical history     Family History:     Family History   Problem Relation Age of Onset   Surendra Reed Parkinsonism Mother     Breast cancer Sister       Social History:     Social History     Socioeconomic History    Marital status:      Spouse name: None    Number of children: None    Years of education: None    Highest education level: None   Occupational History    None   Tobacco Use    Smoking status: Former Smoker     Packs/day: 1 00     Years: 2 00     Pack years: 2 00     Types: Cigarettes     Start date: 38     Quit date:      Years since quittin 3    Smokeless tobacco: Never Used   Vaping Use    Vaping Use: Never used   Substance and Sexual Activity    Alcohol use: Not Currently    Drug use: Never    Sexual activity: None   Other Topics Concern    None   Social History Narrative    None     Social Determinants of Health     Financial Resource Strain: Not on file   Food Insecurity: Not on file   Transportation Needs: Not on file   Physical Activity: Not on file   Stress: Not on file   Social Connections: Not on file   Intimate Partner Violence: Not on file   Housing Stability: Not on file      Medications and Allergies:     Current Outpatient Medications   Medication Sig Dispense Refill    ALPRAZolam (XANAX) 0 5 mg tablet Take 1 tablet (0 5 mg total) by mouth 3 (three) times a day as needed for anxiety 30 tablet 0    amLODIPine (NORVASC) 5 mg tablet Take 5 mg by mouth daily Blood presuure      atorvastatin (LIPITOR) 40 mg tablet Take 1 tablet (40 mg total) by mouth daily 90 tablet 3    metFORMIN (GLUCOPHAGE) 1000 MG tablet Take 1 tablet (1,000 mg total) by mouth 2 (two) times a day 180 tablet 0    Ozempic, 1 MG/DOSE, 4 MG/3ML SOPN injection pen Inject 1 mg under the skin once a week      traZODone (DESYREL) 50 mg tablet Take 1 tablet by mouth 2 (two) times a day      Empagliflozin (Jardiance) 10 MG TABS Take 1 tablet (10 mg total) by mouth every morning 90 tablet 1    Multiple Vitamins-Minerals (CENTRUM SILVER 50+MEN) TABS Take 1 tablet by mouth daily (Patient not taking: Reported on 4/20/2022 )       No current facility-administered medications for this visit  No Known Allergies   Immunizations:     Immunization History   Administered Date(s) Administered    Tdap 08/16/2016      Health Maintenance:         Topic Date Due    Colorectal Cancer Screening  Never done    Hepatitis C Screening  Completed         Topic Date Due    COVID-19 Vaccine (1) Never done    Pneumococcal Vaccine: 65+ Years (1 of 2 - PPSV23) Never done    Influenza Vaccine (1) Never done      Medicare Health Risk Assessment:     /70 (BP Location: Left arm, Patient Position: Sitting, Cuff Size: Large)   Pulse 90   Temp 98 3 °F (36 8 °C) (Tympanic)   Ht 6' (1 829 m)   Wt 121 kg (266 lb 11 2 oz)   SpO2 94%   BMI 36 17 kg/m²      Maynor Restrepo is here for his Subsequent Wellness visit  Last Medicare Wellness visit information reviewed, patient interviewed and updates made to the record today  Health Risk Assessment:   Patient rates overall health as good  Patient feels that their physical health rating is slightly better  Patient is very satisfied with their life  Eyesight was rated as same   Hearing was rated as same  Patient feels that their emotional and mental health rating is same  Patients states they are never, rarely angry  Patient states they are never, rarely unusually tired/fatigued  Pain experienced in the last 7 days has been none  Patient states that he has experienced no weight loss or gain in last 6 months  Depression Screening:   PHQ-2 Score: 0      Fall Risk Screening: In the past year, patient has experienced: no history of falling in past year      Home Safety:  Patient does not have trouble with stairs inside or outside of their home  Patient has working smoke alarms and has working carbon monoxide detector  Home safety hazards include: none  Nutrition:   Current diet is Regular  Needs to follow a diabetic diet    Medications:   Patient is currently taking over-the-counter supplements  OTC medications include: see medication list  Patient is able to manage medications  Activities of Daily Living (ADLs)/Instrumental Activities of Daily Living (IADLs):   Walk and transfer into and out of bed and chair?: Yes  Dress and groom yourself?: Yes    Bathe or shower yourself?: Yes    Feed yourself? Yes  Do your laundry/housekeeping?: Yes  Manage your money, pay your bills and track your expenses?: Yes  Make your own meals?: Yes    Do your own shopping?: Yes    Previous Hospitalizations:   Any hospitalizations or ED visits within the last 12 months?: No      Advance Care Planning:   Living will: Yes    Durable POA for healthcare:  Yes    Advanced directive: Yes      Cognitive Screening:   Provider or family/friend/caregiver concerned regarding cognition?: No    PREVENTIVE SCREENINGS      Cardiovascular Screening:    General: Screening Not Indicated and History Lipid Disorder      Diabetes Screening:     General: Screening Not Indicated and History Diabetes      Colorectal Cancer Screening:     General: Risks and Benefits Discussed    Due for: FOBT/FIT      Prostate Cancer Screening:    General: Screening Current      Osteoporosis Screening:    General: Screening Not Indicated      Abdominal Aortic Aneurysm (AAA) Screening:    Risk factors include: age between 73-69 yo and tobacco use        General: Risks and Benefits Discussed and Patient Declines      Lung Cancer Screening:     General: Screening Not Indicated      Hepatitis C Screening:    General: Screening Current    Screening, Brief Intervention, and Referral to Treatment (SBIRT)    Screening  Typical number of drinks in a day: 0  Typical number of drinks in a week: 0  Interpretation: Low risk drinking behavior      AUDIT-C Screenin) How often did you have a drink containing alcohol in the past year? never  2) How many drinks did you have on a typical day when you were drinking in the past year? 0  3) How often did you have 6 or more drinks on one occasion in the past year? never    AUDIT-C Score: 0  Interpretation: Score 0-3 (male): Negative screen for alcohol misuse    Single Item Drug Screening:  How often have you used an illegal drug (including marijuana) or a prescription medication for non-medical reasons in the past year? never    Single Item Drug Screen Score: 0  Interpretation: Negative screen for possible drug use disorder      Jose Quezada,

## 2022-04-26 DIAGNOSIS — E78.5 HYPERLIPIDEMIA, UNSPECIFIED HYPERLIPIDEMIA TYPE: ICD-10-CM

## 2022-04-26 RX ORDER — ATORVASTATIN CALCIUM 40 MG/1
TABLET, FILM COATED ORAL
Qty: 90 TABLET | Refills: 3 | Status: SHIPPED | OUTPATIENT
Start: 2022-04-26

## 2022-05-02 DIAGNOSIS — F41.9 ANXIETY: ICD-10-CM

## 2022-05-03 RX ORDER — ALPRAZOLAM 0.5 MG/1
0.5 TABLET ORAL 3 TIMES DAILY PRN
Qty: 30 TABLET | Refills: 5 | Status: SHIPPED | OUTPATIENT
Start: 2022-05-03

## 2022-05-03 NOTE — PROGRESS NOTES
The [facility] prescription drug monitoring program was queried  There were no red flags  Safe to proceed

## 2022-06-04 ENCOUNTER — APPOINTMENT (OUTPATIENT)
Dept: LAB | Facility: MEDICAL CENTER | Age: 72
End: 2022-06-04
Payer: MEDICARE

## 2022-06-04 DIAGNOSIS — Z12.11 COLON CANCER SCREENING: ICD-10-CM

## 2022-06-04 LAB — HEMOCCULT STL QL IA: NEGATIVE

## 2022-06-04 PROCEDURE — G0328 FECAL BLOOD SCRN IMMUNOASSAY: HCPCS

## 2022-06-06 DIAGNOSIS — E11.65 UNCONTROLLED TYPE 2 DIABETES MELLITUS WITH HYPERGLYCEMIA (HCC): ICD-10-CM

## 2022-09-23 ENCOUNTER — TELEPHONE (OUTPATIENT)
Dept: FAMILY MEDICINE CLINIC | Facility: CLINIC | Age: 72
End: 2022-09-23

## 2022-09-23 DIAGNOSIS — E11.9 TYPE 2 DIABETES MELLITUS WITHOUT COMPLICATION, WITHOUT LONG-TERM CURRENT USE OF INSULIN (HCC): Primary | ICD-10-CM

## 2022-09-23 DIAGNOSIS — E11.65 UNCONTROLLED TYPE 2 DIABETES MELLITUS WITH HYPERGLYCEMIA (HCC): ICD-10-CM

## 2022-09-23 NOTE — TELEPHONE ENCOUNTER
I called the patient as directed  I had to leave a message to call and schedule his appointment with blood work

## 2022-10-11 PROBLEM — Z12.11 COLON CANCER SCREENING: Status: RESOLVED | Noted: 2022-04-20 | Resolved: 2022-10-11

## 2022-10-12 PROBLEM — Z12.5 PROSTATE CANCER SCREENING: Status: RESOLVED | Noted: 2021-02-22 | Resolved: 2022-10-12

## 2022-11-11 DIAGNOSIS — E78.5 HYPERLIPIDEMIA, UNSPECIFIED HYPERLIPIDEMIA TYPE: ICD-10-CM

## 2022-11-11 RX ORDER — ATORVASTATIN CALCIUM 40 MG/1
TABLET, FILM COATED ORAL
Qty: 90 TABLET | Refills: 3 | Status: SHIPPED | OUTPATIENT
Start: 2022-11-11

## 2023-01-10 DIAGNOSIS — E11.65 UNCONTROLLED TYPE 2 DIABETES MELLITUS WITH HYPERGLYCEMIA (HCC): ICD-10-CM

## 2023-03-16 ENCOUNTER — TELEPHONE (OUTPATIENT)
Dept: FAMILY MEDICINE CLINIC | Facility: CLINIC | Age: 73
End: 2023-03-16

## 2023-03-16 NOTE — TELEPHONE ENCOUNTER
Spoke with patient to see if he had recent eye exam  Patient aware that he needs to schedule eye exam

## 2023-05-11 ENCOUNTER — TELEPHONE (OUTPATIENT)
Dept: FAMILY MEDICINE CLINIC | Facility: CLINIC | Age: 73
End: 2023-05-11

## 2023-05-11 NOTE — TELEPHONE ENCOUNTER
Called patient to schedule him an appointment  Patent is overdue  We have not seen him since April 2022  Patient did not answer so I left a message for the patient to call the office to get this scheduled

## 2023-07-31 ENCOUNTER — OFFICE VISIT (OUTPATIENT)
Dept: FAMILY MEDICINE CLINIC | Facility: CLINIC | Age: 73
End: 2023-07-31
Payer: MEDICARE

## 2023-07-31 VITALS
SYSTOLIC BLOOD PRESSURE: 166 MMHG | TEMPERATURE: 97.4 F | BODY MASS INDEX: 33.02 KG/M2 | WEIGHT: 235.9 LBS | DIASTOLIC BLOOD PRESSURE: 76 MMHG | HEART RATE: 77 BPM | OXYGEN SATURATION: 97 % | HEIGHT: 71 IN

## 2023-07-31 DIAGNOSIS — E11.9 TYPE 2 DIABETES MELLITUS WITHOUT COMPLICATION, WITHOUT LONG-TERM CURRENT USE OF INSULIN (HCC): ICD-10-CM

## 2023-07-31 DIAGNOSIS — N18.31 STAGE 3A CHRONIC KIDNEY DISEASE (HCC): ICD-10-CM

## 2023-07-31 DIAGNOSIS — H60.331 ACUTE SWIMMER'S EAR OF RIGHT SIDE: Primary | ICD-10-CM

## 2023-07-31 PROCEDURE — 99213 OFFICE O/P EST LOW 20 MIN: CPT | Performed by: FAMILY MEDICINE

## 2023-07-31 RX ORDER — NEOMYCIN SULFATE, POLYMYXIN B SULFATE AND HYDROCORTISONE 10; 3.5; 1 MG/ML; MG/ML; [USP'U]/ML
4 SUSPENSION/ DROPS AURICULAR (OTIC) 4 TIMES DAILY
Qty: 10 ML | Refills: 0 | Status: SHIPPED | OUTPATIENT
Start: 2023-07-31

## 2023-07-31 NOTE — PROGRESS NOTES
Name: Romayne Landsman      : 1950      MRN: [id number]  Encounter Provider: Cole Jacobs DO  Encounter Date: 2023   Encounter department: [address] [place] [address]     1. Acute swimmer's ear of right side  Assessment & Plan:  Patient has otitis externa which appears to be mild. Started the patient on Cortisporin otic suspension. He will use 4 drops to the right ear 4 times per day for 10 days. He was advised he must keep the ear dry. No swimming. Must even keep water from entering his ear while showering. Patient will return to the office if no improvement or worsens. Orders:  -     neomycin-polymyxin-hydrocortisone (CORTISPORIN) 0.35%-10,000 units/mL-1% otic suspension; Administer 4 drops to the right ear 4 (four) times a day    2. Type 2 diabetes mellitus without complication, without long-term current use of insulin ([facility])    3. Stage 3a chronic kidney disease ([address])         Subjective      This is a 68-year-old white male who presents to the office today planing of a right earache. The patient lives in [place] but also spends time in [address] [place] [address]. Tells me he recently got to town about a month ago. He has been followed closely by his [place] physician for his diabetes. He has been able to lose quite a bit of weight since I last saw him. He attributes this to now being on Ozempic. The patient tells me that the reason he made the appointment today is a right earache. He tells me he went swimming. He dove off a diving board. He developed pain in his right ear. He had this week Librium that lasted about 5 minutes. His ear feels blocked. He denies any drainage. He has not had fever or chills. He denies any cold type symptoms. Review of Systems   Constitutional: Negative for chills, fever and unexpected weight change. HENT: Negative for congestion, rhinorrhea, sneezing and sore throat.     Respiratory: Negative for cough, shortness of breath and wheezing. Cardiovascular: Negative for chest pain, palpitations and leg swelling. Current Outpatient Medications on File Prior to Visit   Medication Sig   • ALPRAZolam (XANAX) 0.5 mg tablet Take 1 tablet (0.5 mg total) by mouth 3 (three) times a day as needed for anxiety   • amLODIPine (NORVASC) 5 mg tablet Take 5 mg by mouth daily Blood presuure   • atorvastatin (LIPITOR) 40 mg tablet TAKE 1 TABLET BY MOUTH EVERY DAY   • metFORMIN (GLUCOPHAGE) 1000 MG tablet TAKE 1 TABLET BY MOUTH TWICE A DAY   • Ozempic, 1 MG/DOSE, 4 MG/3ML SOPN injection pen Inject 1 mg under the skin once a week   • traZODone (DESYREL) 50 mg tablet Take 1 tablet by mouth 2 (two) times a day   • [DISCONTINUED] Empagliflozin (Jardiance) 10 MG TABS Take 1 tablet (10 mg total) by mouth every morning (Patient not taking: Reported on 7/31/2023)   • [DISCONTINUED] Multiple Vitamins-Minerals (CENTRUM SILVER 50+MEN) TABS Take 1 tablet by mouth daily (Patient not taking: Reported on 4/20/2022)       Objective     /76   Pulse 77   Temp (!) 97.4 °F (36.3 °C) (Tympanic)   Ht 5' 11" (1.803 m)   Wt 107 kg (235 lb 14.4 oz)   SpO2 97%   BMI 32.90 kg/m²     Physical Exam  Vitals reviewed. Constitutional:       Comments: This patient is a 21-year-old white male who appears his stated age. He is pleasant, cooperative, and in no distress   HENT:      Head: Normocephalic and atraumatic. Right Ear: Tympanic membrane and external ear normal. There is no impacted cerumen. Left Ear: Tympanic membrane, ear canal and external ear normal. There is no impacted cerumen. Ears:      Comments: Mild swelling of the right external auditory canal noted     Mouth/Throat:      Mouth: Mucous membranes are moist.      Pharynx: Oropharynx is clear. No oropharyngeal exudate or posterior oropharyngeal erythema. Eyes:      General: No scleral icterus. Right eye: No discharge. Left eye: No discharge.       Conjunctiva/sclera: Conjunctivae normal.      Pupils: Pupils are equal, round, and reactive to light. Cardiovascular:      Rate and Rhythm: Normal rate and regular rhythm. Pulses: Pulses are weak. Dorsalis pedis pulses are 0 on the right side and 0 on the left side. Posterior tibial pulses are 0 on the right side and 0 on the left side. Heart sounds: Normal heart sounds. No murmur heard. No friction rub. No gallop. Pulmonary:      Effort: Pulmonary effort is normal. No respiratory distress. Breath sounds: Normal breath sounds. No stridor. No wheezing, rhonchi or rales. Musculoskeletal:      Cervical back: Neck supple. Feet:      Right foot:      Skin integrity: No ulcer, skin breakdown, erythema, warmth, callus or dry skin. Left foot:      Skin integrity: No ulcer, skin breakdown, erythema, warmth, callus or dry skin. Lymphadenopathy:      Cervical: No cervical adenopathy. Patient's shoes and socks removed. Right Foot/Ankle   Right Foot Inspection  Skin Exam: skin normal and skin intact. No dry skin, no warmth, no callus, no erythema, no maceration, no abnormal color, no pre-ulcer, no ulcer and no callus. Toe Exam: No swelling, no tenderness, erythema and  no right toe deformity    Sensory   Vibration: intact  Proprioception: intact  Monofilament testing: intact    Vascular  Capillary refills: < 3 seconds  The right DP pulse is 0. The right PT pulse is 0. Left Foot/Ankle  Left Foot Inspection  Skin Exam: skin normal and skin intact. No dry skin, no warmth, no erythema, no maceration, normal color, no pre-ulcer, no ulcer and no callus. Toe Exam: No swelling, no tenderness, no erythema and no left toe deformity. Sensory   Vibration: intact  Proprioception: intact  Monofilament testing: intact    Vascular  Capillary refills: < 3 seconds  The left DP pulse is 0. The left PT pulse is 0.      Assign Risk Category  No deformity present  No loss of protective sensation  Weak pulses  Risk: 1      Iris Barry, DO

## 2023-08-01 NOTE — ASSESSMENT & PLAN NOTE
Patient has otitis externa which appears to be mild. Started the patient on Cortisporin otic suspension. He will use 4 drops to the right ear 4 times per day for 10 days. He was advised he must keep the ear dry. No swimming. Must even keep water from entering his ear while showering. Patient will return to the office if no improvement or worsens.

## 2023-09-29 PROBLEM — H60.331 ACUTE SWIMMER'S EAR OF RIGHT SIDE: Status: RESOLVED | Noted: 2023-07-31 | Resolved: 2023-09-29

## 2023-10-06 DIAGNOSIS — F41.9 ANXIETY: ICD-10-CM

## 2023-10-06 NOTE — TELEPHONE ENCOUNTER
Medication Refill Request     Name Xanax  Dose/Frequency 0.5 mg  Quantity 30  Verified pharmacy   [x]  Verified ordering Provider   [x]  Does patient have enough for the next 3 days?  Yes [] No [x]

## 2023-10-09 RX ORDER — ALPRAZOLAM 0.5 MG/1
0.5 TABLET ORAL 3 TIMES DAILY PRN
Qty: 30 TABLET | Refills: 5 | Status: SHIPPED | OUTPATIENT
Start: 2023-10-09

## 2023-11-03 DIAGNOSIS — E78.5 HYPERLIPIDEMIA, UNSPECIFIED HYPERLIPIDEMIA TYPE: ICD-10-CM

## 2023-11-03 RX ORDER — ATORVASTATIN CALCIUM 40 MG/1
TABLET, FILM COATED ORAL
Qty: 90 TABLET | Refills: 0 | Status: SHIPPED | OUTPATIENT
Start: 2023-11-03

## 2024-02-21 PROBLEM — Z00.00 ENCOUNTER FOR MEDICARE ANNUAL WELLNESS EXAM: Status: RESOLVED | Noted: 2020-02-13 | Resolved: 2024-02-21

## 2024-03-22 DIAGNOSIS — E78.5 HYPERLIPIDEMIA, UNSPECIFIED HYPERLIPIDEMIA TYPE: ICD-10-CM

## 2024-03-22 RX ORDER — ATORVASTATIN CALCIUM 40 MG/1
TABLET, FILM COATED ORAL
Qty: 90 TABLET | Refills: 0 | Status: SHIPPED | OUTPATIENT
Start: 2024-03-22

## 2024-06-03 LAB — HBA1C MFR BLD HPLC: 15.5 %

## 2024-06-25 ENCOUNTER — TELEPHONE (OUTPATIENT)
Dept: FAMILY MEDICINE CLINIC | Facility: CLINIC | Age: 74
End: 2024-06-25

## 2024-06-25 NOTE — TELEPHONE ENCOUNTER
A medication refill request was sent in,The patient needs an appointment.Patient has not had an appointment and is well over due.[place].Refills denied as per

## 2024-06-26 NOTE — TELEPHONE ENCOUNTER
PT called back returning missed call from Adeola.     Informed pt reason of call.     PT stated that he will be in the area later within the week and will call to schedule an appt.

## 2024-07-08 DIAGNOSIS — E78.5 HYPERLIPIDEMIA, UNSPECIFIED HYPERLIPIDEMIA TYPE: ICD-10-CM

## 2024-07-08 RX ORDER — ATORVASTATIN CALCIUM 40 MG/1
40 TABLET, FILM COATED ORAL DAILY
Qty: 10 TABLET | Refills: 0 | Status: SHIPPED | OUTPATIENT
Start: 2024-07-08 | End: 2024-07-16 | Stop reason: SDUPTHER

## 2024-07-08 NOTE — TELEPHONE ENCOUNTER
Reason for call:   [x] Refill   [] Prior Auth  [] Other:     Office:   [x] PCP/Provider - Marv Torres  [] Specialty/Provider -     Medication: Atorvastatin     Dose/Frequency: 40 mg Daily     Quantity: 90    [place] [facility]    Does the patient have enough for 3 days?   [] Yes   [x] No - Send as HP to POD     1.51 1.46 1.46

## 2024-07-16 ENCOUNTER — TELEPHONE (OUTPATIENT)
Dept: FAMILY MEDICINE CLINIC | Facility: CLINIC | Age: 74
End: 2024-07-16

## 2024-07-16 ENCOUNTER — OFFICE VISIT (OUTPATIENT)
Dept: FAMILY MEDICINE CLINIC | Facility: CLINIC | Age: 74
End: 2024-07-16
Payer: MEDICARE

## 2024-07-16 VITALS
SYSTOLIC BLOOD PRESSURE: 126 MMHG | DIASTOLIC BLOOD PRESSURE: 78 MMHG | OXYGEN SATURATION: 97 % | TEMPERATURE: 96.4 F | HEIGHT: 71 IN | WEIGHT: 223.5 LBS | BODY MASS INDEX: 31.29 KG/M2 | HEART RATE: 62 BPM

## 2024-07-16 DIAGNOSIS — E11.9 TYPE 2 DIABETES MELLITUS WITHOUT COMPLICATION, WITHOUT LONG-TERM CURRENT USE OF INSULIN (HCC): ICD-10-CM

## 2024-07-16 DIAGNOSIS — I10 ESSENTIAL HYPERTENSION: ICD-10-CM

## 2024-07-16 DIAGNOSIS — F41.9 ANXIETY: Primary | ICD-10-CM

## 2024-07-16 DIAGNOSIS — E78.5 HYPERLIPIDEMIA, UNSPECIFIED HYPERLIPIDEMIA TYPE: ICD-10-CM

## 2024-07-16 PROBLEM — E66.01 OBESITY, MORBID (HCC): Status: RESOLVED | Noted: 2021-02-22 | Resolved: 2024-07-16

## 2024-07-16 PROBLEM — N18.31 STAGE 3A CHRONIC KIDNEY DISEASE (HCC): Status: RESOLVED | Noted: 2022-04-20 | Resolved: 2024-07-16

## 2024-07-16 PROCEDURE — 99214 OFFICE O/P EST MOD 30 MIN: CPT | Performed by: FAMILY MEDICINE

## 2024-07-16 PROCEDURE — G2211 COMPLEX E/M VISIT ADD ON: HCPCS | Performed by: FAMILY MEDICINE

## 2024-07-16 RX ORDER — ATORVASTATIN CALCIUM 40 MG/1
40 TABLET, FILM COATED ORAL DAILY
Qty: 100 TABLET | Refills: 2 | Status: SHIPPED | OUTPATIENT
Start: 2024-07-16

## 2024-07-16 NOTE — ASSESSMENT & PLAN NOTE
I rechecked the patient's blood pressure and found his blood pressure to be well-controlled at 134/78.  I recommended no change with his amlodipine.

## 2024-07-16 NOTE — PROGRESS NOTES
Ambulatory Visit  Name: Avinash Pascual      : 1950      MRN: [id number]  Encounter Provider: Marv Torres DO  Encounter Date: 2024   Encounter department: [facility]    Assessment & Plan   1. Anxiety  Assessment & Plan:  Patient has anxiety.  He will continue alprazolam 0.5 mg 3 times per day as needed.  2. Hyperlipidemia, unspecified hyperlipidemia type  Assessment & Plan:  Patient needed a refill for his atorvastatin.  I reviewed his fasting lipid panel.  His total cholesterol is 160.  Triglycerides are 156.  His HDL is 61.  LDL was 73.  Goal is less than 70.  I refilled his atorvastatin 40 mg daily.  Orders:  -     atorvastatin (LIPITOR) 40 mg tablet; Take 1 tablet (40 mg total) by mouth daily  3. Type 2 diabetes mellitus without complication, without long-term current use of insulin ([facility])  Assessment & Plan:    Lab Results   Component Value Date    HGBA1C 7.4 (H) 2022   Patient has poorly controlled type 2 diabetes mellitus.  I reviewed his labs.  His hemoglobin A1c was greater than 15.5.  There is fasting blood glucose was 390.  I do not know what he was prior to this, when he was on the Ozempic regularly but it sounds like his hemoglobin A1c was still well above goal.  I told the patient he needs better glycemic control.  The patient is at risk for complications due to diabetes.  I discussed this with him.  I recommend he increase his Ozempic to 0.5 mg weekly for 4 weeks.  He should then call and he should be increased back to the 1 mg dose which is the last dose that I have listed in his chart.  He really does not know the dose that he was taking prior to running out of the medication.  I told him there is a 2 mg dose but I suspect he was only on the 1 mg.  I offered to refer the patient to a dietitian.  I highly recommended this for him but he declined.  I did think that seeing a dietitian will help to get his blood sugars under better control but he declined.  He  "needs to check his blood sugars regularly as well.  4. Essential hypertension  Assessment & Plan:  I rechecked the patient's blood pressure and found his blood pressure to be well-controlled at 134/78.  I recommended no change with his amlodipine.      Depression Screening and Follow-up Plan: Patient was screened for depression during today's encounter. They screened negative with a PHQ-2 score of 0.      History of Present Illness     The patient is a 74-year-old white male who presents to the office today for a follow-up visit.  The patient has a home here in [place] but spends a large portion of his time in [place], visiting family.  His main doctor is in [place].  Patient tells me he was on Ozempic but ran out for 2 months.  His blood sugars went way up.  He tells me he is active but he does not formally exercise.  He tries to watch his diet.  He tells me he believes he eats much better in [place] than he does in [place].  He tells me when he was finally able to get Ozempic again, about a month ago, he got very sick on the higher dose that he was taking previously.  He is currently now on the 0.25 mg/week dose.  He did bring with him his most recent labs from [place].  These are dated from [facility].  I have no other labs available and I do not know what his hemoglobin A1c was running prior to this.        Review of Systems   Eyes:  Positive for visual disturbance.   Cardiovascular:  Negative for chest pain, palpitations and leg swelling.   Gastrointestinal:  Negative for abdominal distention, abdominal pain, blood in stool, constipation, diarrhea and nausea.   Endocrine: Positive for polydipsia. Negative for polyuria.   Skin:  Negative for wound.       Objective     /78 (BP Location: Left arm, Patient Position: Sitting, Cuff Size: Adult)   Pulse 62   Temp (!) 96.4 °F (35.8 °C) (Tympanic)   Ht 5' 11\" (1.803 m)   Wt 101 kg (223 lb 8 oz)   SpO2 97%   BMI 31.17 kg/m²     Physical " Exam  Vitals reviewed.   Constitutional:       Comments: Patient is a 74-year-old white male who appears his stated age.  The patient is nonseptic in appearance and in no apparent distress   HENT:      Head: Normocephalic and atraumatic.      Right Ear: Tympanic membrane, ear canal and external ear normal. There is no impacted cerumen.      Left Ear: Tympanic membrane, ear canal and external ear normal. There is no impacted cerumen.      Mouth/Throat:      Mouth: Mucous membranes are moist.      Pharynx: Oropharynx is clear. No oropharyngeal exudate or posterior oropharyngeal erythema.   Eyes:      General: No scleral icterus.        Right eye: No discharge.         Left eye: No discharge.      Conjunctiva/sclera: Conjunctivae normal.      Pupils: Pupils are equal, round, and reactive to light.   Neck:      Vascular: No carotid bruit.      Comments: There is no thyromegaly  Cardiovascular:      Rate and Rhythm: Normal rate and regular rhythm.      Pulses: no weak pulses.           Dorsalis pedis pulses are 2+ on the right side and 2+ on the left side.        Posterior tibial pulses are 2+ on the right side and 2+ on the left side.      Heart sounds: Normal heart sounds. No murmur heard.     No friction rub. No gallop.   Pulmonary:      Effort: Pulmonary effort is normal. No respiratory distress.      Breath sounds: Normal breath sounds. No stridor. No wheezing, rhonchi or rales.   Abdominal:      General: Bowel sounds are normal. There is no distension.      Palpations: Abdomen is soft. There is no mass.      Tenderness: There is no abdominal tenderness. There is no guarding.   Musculoskeletal:      Cervical back: Neck supple.      Right lower leg: No edema.      Left lower leg: No edema.   Feet:      Right foot:      Skin integrity: No ulcer, skin breakdown, erythema, warmth, callus or dry skin.      Left foot:      Skin integrity: No ulcer, skin breakdown, erythema, warmth, callus or dry skin.   Lymphadenopathy:       Cervical: No cervical adenopathy.   Psychiatric:         Mood and Affect: Mood normal.         Behavior: Behavior normal.         Thought Content: Thought content normal.         Judgment: Judgment normal.       Diabetic Foot Exam    Patient's shoes and socks removed.    Right Foot/Ankle   Right Foot Inspection  Skin Exam: skin normal and skin intact. No dry skin, no warmth, no callus, no erythema, no maceration, no abnormal color, no pre-ulcer, no ulcer and no callus.     Toe Exam: No swelling, no tenderness, erythema and  no right toe deformity    Sensory   Vibration: diminished  Proprioception: intact  Monofilament testing: diminished    Vascular  Capillary refills: < 3 seconds  The right DP pulse is 2+. The right PT pulse is 2+.     Left Foot/Ankle  Left Foot Inspection  Skin Exam: skin normal and skin intact. No dry skin, no warmth, no erythema, no maceration, normal color, no pre-ulcer, no ulcer and no callus.     Toe Exam: No swelling, no tenderness, no erythema and no left toe deformity.     Sensory   Vibration: intact  Proprioception: intact  Monofilament testing: intact    Vascular  Capillary refills: < 3 seconds  The left DP pulse is 2+. The left PT pulse is 2+.     Assign Risk Category  No deformity present  Loss of protective sensation  No weak pulses  Risk: 1    Administrative Statements

## 2024-07-16 NOTE — ASSESSMENT & PLAN NOTE
Patient needed a refill for his atorvastatin.  I reviewed his fasting lipid panel.  His total cholesterol is 160.  Triglycerides are 156.  His HDL is 61.  LDL was 73.  Goal is less than 70.  I refilled his atorvastatin 40 mg daily.

## 2024-07-16 NOTE — TELEPHONE ENCOUNTER
Patient came in today for an appointment and was very unclear of his insurance coverage.His secondary insurance was effective.Patient was instructed to call us today with the correct coverage.

## 2024-07-17 NOTE — ASSESSMENT & PLAN NOTE
Lab Results   Component Value Date    HGBA1C 7.4 (H) 04/18/2022   Patient has poorly controlled type 2 diabetes mellitus.  I reviewed his labs.  His hemoglobin A1c was greater than 15.5.  There is fasting blood glucose was 390.  I do not know what he was prior to this, when he was on the Ozempic regularly but it sounds like his hemoglobin A1c was still well above goal.  I told the patient he needs better glycemic control.  The patient is at risk for complications due to diabetes.  I discussed this with him.  I recommend he increase his Ozempic to 0.5 mg weekly for 4 weeks.  He should then call and he should be increased back to the 1 mg dose which is the last dose that I have listed in his chart.  He really does not know the dose that he was taking prior to running out of the medication.  I told him there is a 2 mg dose but I suspect he was only on the 1 mg.  I offered to refer the patient to a dietitian.  I highly recommended this for him but he declined.  I did think that seeing a dietitian will help to get his blood sugars under better control but he declined.  He needs to check his blood sugars regularly as well.

## 2025-03-22 DIAGNOSIS — E78.5 HYPERLIPIDEMIA, UNSPECIFIED HYPERLIPIDEMIA TYPE: ICD-10-CM

## 2025-03-24 ENCOUNTER — TELEPHONE (OUTPATIENT)
Dept: FAMILY MEDICINE CLINIC | Facility: CLINIC | Age: 75
End: 2025-03-24

## 2025-03-24 RX ORDER — ATORVASTATIN CALCIUM 40 MG/1
40 TABLET, FILM COATED ORAL DAILY
Qty: 30 TABLET | Refills: 0 | Status: SHIPPED | OUTPATIENT
Start: 2025-03-24

## 2025-03-31 ENCOUNTER — OFFICE VISIT (OUTPATIENT)
Dept: FAMILY MEDICINE CLINIC | Facility: CLINIC | Age: 75
End: 2025-03-31
Payer: COMMERCIAL

## 2025-03-31 VITALS
OXYGEN SATURATION: 99 % | TEMPERATURE: 96.2 F | DIASTOLIC BLOOD PRESSURE: 65 MMHG | HEIGHT: 71 IN | BODY MASS INDEX: 30.23 KG/M2 | HEART RATE: 110 BPM | WEIGHT: 215.9 LBS | SYSTOLIC BLOOD PRESSURE: 136 MMHG

## 2025-03-31 DIAGNOSIS — Z13.6 ENCOUNTER FOR ABDOMINAL AORTIC ANEURYSM SCREENING: ICD-10-CM

## 2025-03-31 DIAGNOSIS — M77.8 RIGHT SHOULDER TENDINITIS: ICD-10-CM

## 2025-03-31 DIAGNOSIS — E11.9 TYPE 2 DIABETES MELLITUS WITHOUT COMPLICATION, WITHOUT LONG-TERM CURRENT USE OF INSULIN (HCC): Primary | ICD-10-CM

## 2025-03-31 DIAGNOSIS — Z12.5 SCREENING FOR PROSTATE CANCER: ICD-10-CM

## 2025-03-31 DIAGNOSIS — Z12.11 COLON CANCER SCREENING: ICD-10-CM

## 2025-03-31 DIAGNOSIS — Z00.00 MEDICARE ANNUAL WELLNESS VISIT, SUBSEQUENT: ICD-10-CM

## 2025-03-31 DIAGNOSIS — I10 ESSENTIAL HYPERTENSION: ICD-10-CM

## 2025-03-31 LAB
CREAT UR-MCNC: 25.8 MG/DL
MICROALBUMIN UR-MCNC: 10.7 MG/L
MICROALBUMIN/CREAT 24H UR: 41 MG/G CREATININE (ref 0–30)

## 2025-03-31 PROCEDURE — G2211 COMPLEX E/M VISIT ADD ON: HCPCS | Performed by: FAMILY MEDICINE

## 2025-03-31 PROCEDURE — G0439 PPPS, SUBSEQ VISIT: HCPCS | Performed by: FAMILY MEDICINE

## 2025-03-31 PROCEDURE — 82043 UR ALBUMIN QUANTITATIVE: CPT | Performed by: FAMILY MEDICINE

## 2025-03-31 PROCEDURE — 99214 OFFICE O/P EST MOD 30 MIN: CPT | Performed by: FAMILY MEDICINE

## 2025-03-31 PROCEDURE — 82570 ASSAY OF URINE CREATININE: CPT | Performed by: FAMILY MEDICINE

## 2025-03-31 NOTE — PATIENT INSTRUCTIONS
Medicare Preventive Visit Patient Instructions  Thank you for completing your Welcome to Medicare Visit or Medicare Annual Wellness Visit today. Your next wellness visit will be due in one year (4/1/2026).  The screening/preventive services that you may require over the next 5-10 years are detailed below. Some tests may not apply to you based off risk factors and/or age. Screening tests ordered at today's visit but not completed yet may show as past due. Also, please note that scanned in results may not display below.  Preventive Screenings:  Service Recommendations Previous Testing/Comments   Colorectal Cancer Screening  Colonoscopy    Fecal Occult Blood Test (FOBT)/Fecal Immunochemical Test (FIT)  Fecal DNA/Cologuard Test  Flexible Sigmoidoscopy Age: 45-75 years old   Colonoscopy: every 10 years (May be performed more frequently if at higher risk)  OR  FOBT/FIT: every 1 year  OR  Cologuard: every 3 years  OR  Sigmoidoscopy: every 5 years  Screening may be recommended earlier than age 45 if at higher risk for colorectal cancer. Also, an individualized decision between you and your healthcare provider will decide whether screening between the ages of 76-85 would be appropriate. Colonoscopy: Not on file  FOBT/FIT: 06/04/2022  Cologuard: Not on file  Sigmoidoscopy: Not on file          Prostate Cancer Screening Individualized decision between patient and health care provider in men between ages of 55-69   Medicare will cover every 12 months beginning on the day after your 50th birthday PSA: 3.4 ng/mL           Hepatitis C Screening Once for adults born between 1945 and 1965  More frequently in patients at high risk for Hepatitis C Hep C Antibody: 03/01/2021    Screening Current   Diabetes Screening 1-2 times per year if you're at risk for diabetes or have pre-diabetes Fasting glucose: 144 mg/dL (4/18/2022)  A1C: 15.5 (6/3/2024)  Screening Not Indicated  History Diabetes   Cholesterol Screening Once every 5 years if  you don't have a lipid disorder. May order more often based on risk factors. Lipid panel: 04/18/2022  Screening Not Indicated  History Lipid Disorder      Other Preventive Screenings Covered by Medicare:  Abdominal Aortic Aneurysm (AAA) Screening: covered once if your at risk. You're considered to be at risk if you have a family history of AAA or a male between the age of 65-75 who smoking at least 100 cigarettes in your lifetime.  Lung Cancer Screening: covers low dose CT scan once per year if you meet all of the following conditions: (1) Age 55-77; (2) No signs or symptoms of lung cancer; (3) Current smoker or have quit smoking within the last 15 years; (4) You have a tobacco smoking history of at least 20 pack years (packs per day x number of years you smoked); (5) You get a written order from a healthcare provider.  Glaucoma Screening: covered annually if you're considered high risk: (1) You have diabetes OR (2) Family history of glaucoma OR (3)  aged 50 and older OR (4)  American aged 65 and older  Osteoporosis Screening: covered every 2 years if you meet one of the following conditions: (1) Have a vertebral abnormality; (2) On glucocorticoid therapy for more than 3 months; (3) Have primary hyperparathyroidism; (4) On osteoporosis medications and need to assess response to drug therapy.  HIV Screening: covered annually if you're between the age of 15-65. Also covered annually if you are younger than 15 and older than 65 with risk factors for HIV infection. For pregnant patients, it is covered up to 3 times per pregnancy.    Immunizations:  Immunization Recommendations   Influenza Vaccine Annual influenza vaccination during flu season is recommended for all persons aged >= 6 months who do not have contraindications   Pneumococcal Vaccine   * Pneumococcal conjugate vaccine = PCV13 (Prevnar 13), PCV15 (Vaxneuvance), PCV20 (Prevnar 20)  * Pneumococcal polysaccharide vaccine = PPSV23  (Pneumovax) Adults 19-63 yo with certain risk factors or if 65+ yo  If never received any pneumonia vaccine: recommend Prevnar 20 (PCV20)  Give PCV20 if previously received 1 dose of PCV13 or PPSV23   Hepatitis B Vaccine 3 dose series if at intermediate or high risk (ex: diabetes, end stage renal disease, liver disease)   Respiratory syncytial virus (RSV) Vaccine - COVERED BY MEDICARE PART D  * RSVPreF3 (Arexvy) CDC recommends that adults 60 years of age and older may receive a single dose of RSV vaccine using shared clinical decision-making (SCDM)   Tetanus (Td) Vaccine - COST NOT COVERED BY MEDICARE PART B Following completion of primary series, a booster dose should be given every 10 years to maintain immunity against tetanus. Td may also be given as tetanus wound prophylaxis.   Tdap Vaccine - COST NOT COVERED BY MEDICARE PART B Recommended at least once for all adults. For pregnant patients, recommended with each pregnancy.   Shingles Vaccine (Shingrix) - COST NOT COVERED BY MEDICARE PART B  2 shot series recommended in those 19 years and older who have or will have weakened immune systems or those 50 years and older     Health Maintenance Due:      Topic Date Due   • Colorectal Cancer Screening  06/04/2023   • Hepatitis C Screening  Completed     Immunizations Due:      Topic Date Due   • Pneumococcal Vaccine: 65+ Years (1 of 2 - PCV) Never done   • Influenza Vaccine (1) Never done   • COVID-19 Vaccine (1 - 2024-25 season) Never done     Advance Directives   What are advance directives?  Advance directives are legal documents that state your wishes and plans for medical care. These plans are made ahead of time in case you lose your ability to make decisions for yourself. Advance directives can apply to any medical decision, such as the treatments you want, and if you want to donate organs.   What are the types of advance directives?  There are many types of advance directives, and each state has rules about how  to use them. You may choose a combination of any of the following:  Living will:  This is a written record of the treatment you want. You can also choose which treatments you do not want, which to limit, and which to stop at a certain time. This includes surgery, medicine, IV fluid, and tube feedings.   Durable power of  for healthcare (DPAHC):  This is a written record that states who you want to make healthcare choices for you when you are unable to make them for yourself. This person, called a proxy, is usually a family member or a friend. You may choose more than 1 proxy.  Do not resuscitate (DNR) order:  A DNR order is used in case your heart stops beating or you stop breathing. It is a request not to have certain forms of treatment, such as CPR. A DNR order may be included in other types of advance directives.  Medical directive:  This covers the care that you want if you are in a coma, near death, or unable to make decisions for yourself. You can list the treatments you want for each condition. Treatment may include pain medicine, surgery, blood transfusions, dialysis, IV or tube feedings, and a ventilator (breathing machine).  Values history:  This document has questions about your views, beliefs, and how you feel and think about life. This information can help others choose the care that you would choose.  Why are advance directives important?  An advance directive helps you control your care. Although spoken wishes may be used, it is better to have your wishes written down. Spoken wishes can be misunderstood, or not followed. Treatments may be given even if you do not want them. An advance directive may make it easier for your family to make difficult choices about your care.   Weight Management   Why it is important to manage your weight:  Being overweight increases your risk of health conditions such as heart disease, high blood pressure, type 2 diabetes, and certain types of cancer. It can also  increase your risk for osteoarthritis, sleep apnea, and other respiratory problems. Aim for a slow, steady weight loss. Even a small amount of weight loss can lower your risk of health problems.  How to lose weight safely:  A safe and healthy way to lose weight is to eat fewer calories and get regular exercise. You can lose up about 1 pound a week by decreasing the number of calories you eat by 500 calories each day.   Healthy meal plan for weight management:  A healthy meal plan includes a variety of foods, contains fewer calories, and helps you stay healthy. A healthy meal plan includes the following:  Eat whole-grain foods more often.  A healthy meal plan should contain fiber. Fiber is the part of grains, fruits, and vegetables that is not broken down by your body. Whole-grain foods are healthy and provide extra fiber in your diet. Some examples of whole-grain foods are whole-wheat breads and pastas, oatmeal, brown rice, and bulgur.  Eat a variety of vegetables every day.  Include dark, leafy greens such as spinach, kale, candie greens, and mustard greens. Eat yellow and orange vegetables such as carrots, sweet potatoes, and winter squash.   Eat a variety of fruits every day.  Choose fresh or canned fruit (canned in its own juice or light syrup) instead of juice. Fruit juice has very little or no fiber.  Eat low-fat dairy foods.  Drink fat-free (skim) milk or 1% milk. Eat fat-free yogurt and low-fat cottage cheese. Try low-fat cheeses such as mozzarella and other reduced-fat cheeses.  Choose meat and other protein foods that are low in fat.  Choose beans or other legumes such as split peas or lentils. Choose fish, skinless poultry (chicken or turkey), or lean cuts of red meat (beef or pork). Before you cook meat or poultry, cut off any visible fat.   Use less fat and oil.  Try baking foods instead of frying them. Add less fat, such as margarine, sour cream, regular salad dressing and mayonnaise to foods. Eat  fewer high-fat foods. Some examples of high-fat foods include french fries, doughnuts, ice cream, and cakes.  Eat fewer sweets.  Limit foods and drinks that are high in sugar. This includes candy, cookies, regular soda, and sweetened drinks.  Exercise:  Exercise at least 30 minutes per day on most days of the week. Some examples of exercise include walking, biking, dancing, and swimming. You can also fit in more physical activity by taking the stairs instead of the elevator or parking farther away from stores. Ask your healthcare provider about the best exercise plan for you.      © Copyright [employer] 2018 Information is for End User's use only and may not be sold, redistributed or otherwise used for commercial purposes. All illustrations and images included in CareNotes® are the copyrighted property of [employer] Inc[employer] or [employer]

## 2025-03-31 NOTE — ASSESSMENT & PLAN NOTE
Patient has uncontrolled type 2 diabetes mellitus.  Patient has not had a dilated eye exam.  I placed a referral to see Dr. Juarez.  He has not had a urine albumin to creatinine ratio.  I obtain this today.  He has not had labs done since last June.  He has been here all winter but I did not know this.  I found out he was in [place] when I received a phone call from the pharmacist to renew his Xanax.  The patient was then contacted and asked to make a phone call.  His diabetes is poorly controlled.  I discussed this with the patient.  I suspect that the reason he has polyuria, polydipsia, and some blurred vision is because his blood sugar is still uncontrolled.  I offered to obtain labs and try to get his blood sugars under control but the patient declined.  He tells me he has an appointment next month to see Dr. Cannon.  He tells me he is already set up for blood work.  Patient's care is fragmented and this is contributing to his poor glycemic control.  The patient does not help is caused by following up regularly.  I suspect he does not see Dr. Cannon very regularly either.  Patient tells me he is returning to [place] in June.  I am going to schedule him a follow-up visit in July.  I am going to try to help get this patient's blood sugars under control, if he will allow me.  I do need the patient to cooperate.  We discussed routine diabetic footcare today as well.  Lab Results   Component Value Date    HGBA1C 15.5 06/03/2024       Orders:    Ambulatory Referral to Ophthalmology; Future    Albumin / creatinine urine ratio; Future
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
## 2025-04-01 ENCOUNTER — HOSPITAL ENCOUNTER (OUTPATIENT)
Dept: ULTRASOUND IMAGING | Facility: HOSPITAL | Age: 75
Discharge: HOME | End: 2025-04-01
Attending: FAMILY MEDICINE
Payer: COMMERCIAL

## 2025-04-01 DIAGNOSIS — Z13.6 ENCOUNTER FOR ABDOMINAL AORTIC ANEURYSM SCREENING: ICD-10-CM

## 2025-04-01 PROCEDURE — 76775 US EXAM ABDO BACK WALL LIM: CPT

## 2025-04-01 NOTE — ASSESSMENT & PLAN NOTE
Blood pressure in the office today was satisfactory.  I made no change with his current medication.

## 2025-04-01 NOTE — ASSESSMENT & PLAN NOTE
Patient has right shoulder tendinitis and bursitis.  I referred the patient for physical therapy.  Hopefully he can get in for some visits before he leaves to go back to [place].  I also placed a referral for him to see orthopedics.  He would likely benefit from a corticosteroid injection.  X-rays of the right shoulder and also been ordered.  Orders:    XR shoulder 2+ vw right; Future    Ambulatory Referral to Orthopedic Surgery; Future    Ambulatory Referral to Physical Therapy; Future

## 2025-04-02 ENCOUNTER — APPOINTMENT (OUTPATIENT)
Dept: RADIOLOGY | Facility: MEDICAL CENTER | Age: 75
End: 2025-04-02
Payer: COMMERCIAL

## 2025-04-02 VITALS
BODY MASS INDEX: 30.21 KG/M2 | OXYGEN SATURATION: 97 % | HEART RATE: 114 BPM | RESPIRATION RATE: 16 BRPM | HEIGHT: 71 IN | WEIGHT: 215.8 LBS | TEMPERATURE: 97.5 F

## 2025-04-02 DIAGNOSIS — M75.41 IMPINGEMENT SYNDROME OF RIGHT SHOULDER: ICD-10-CM

## 2025-04-02 DIAGNOSIS — M77.8 RIGHT SHOULDER TENDINITIS: ICD-10-CM

## 2025-04-02 PROCEDURE — 73030 X-RAY EXAM OF SHOULDER: CPT

## 2025-04-02 PROCEDURE — 99204 OFFICE O/P NEW MOD 45 MIN: CPT | Performed by: STUDENT IN AN ORGANIZED HEALTH CARE EDUCATION/TRAINING PROGRAM

## 2025-04-02 NOTE — ASSESSMENT & PLAN NOTE
Avinash Pascual is a 75 y.o. year old male with left shoulder pain due to impingement, possible RTC tear.    We had a shared decision making discussion regarding their shoulder pain.  The etiology of the pain, physical exam findings, and imaging was reviewed with the patient.        We discussed the different treatment options, as well as the advantages and disadvantages of each.     Non operative management options such as oral anti-inflammatories, steroid injections, and physical therapy are effective in decreasing pain, improving function, and often are sufficient to improve their pain and restore function.  The risks of these non operative management options are low, and the benefit is the avoidance of surgery and improvement in pain and function.     We also discussed additional imaging to further evaluate the rotator cuff, such as ultrasound and MRI, as well as potential damage to surrounding structures including the biceps tendon  .    Finally, we discussed surgery as an option for people that do not improve sufficiently with non-operative management, this is often times dependent upon the results of advanced imaging and may include procedures such as arthroscopic debridement of the rotator cuff, possible arthroscopic treatment of any rotator cuff tears or specific treatments for the biceps tendon.  We reviewed the outcomes of surgery as well as the recovery process involved.     Patient not a candidate for steroid injection or potential surgery due to A1C ~15. Will start PT, can continue topical meds and tylenol. Patient to see MD in [place] for new lab work. To return to me after his trip and reassess his symptoms. If A1C improved can consider CSI at that time.    Orders:    Ambulatory Referral to Orthopedic Surgery    Ambulatory Referral to Physical Therapy; Future

## 2025-04-02 NOTE — PROGRESS NOTES
Assessment & Plan  Impingement syndrome of right shoulder  Avinash Pascual is a 75 y.o. year old male with left shoulder pain due to impingement, possible RTC tear.    We had a shared decision making discussion regarding their shoulder pain.  The etiology of the pain, physical exam findings, and imaging was reviewed with the patient.        We discussed the different treatment options, as well as the advantages and disadvantages of each.     Non operative management options such as oral anti-inflammatories, steroid injections, and physical therapy are effective in decreasing pain, improving function, and often are sufficient to improve their pain and restore function.  The risks of these non operative management options are low, and the benefit is the avoidance of surgery and improvement in pain and function.     We also discussed additional imaging to further evaluate the rotator cuff, such as ultrasound and MRI, as well as potential damage to surrounding structures including the biceps tendon  .    Finally, we discussed surgery as an option for people that do not improve sufficiently with non-operative management, this is often times dependent upon the results of advanced imaging and may include procedures such as arthroscopic debridement of the rotator cuff, possible arthroscopic treatment of any rotator cuff tears or specific treatments for the biceps tendon.  We reviewed the outcomes of surgery as well as the recovery process involved.     Patient not a candidate for steroid injection or potential surgery due to A1C ~15. Will start PT, can continue topical meds and tylenol. Patient to see MD in [place] for new lab work. To return to me after his trip and reassess his symptoms. If A1C improved can consider CSI at that time.    Orders:    Ambulatory Referral to Orthopedic Surgery    Ambulatory Referral to Physical Therapy; Future             General  Chief Complaint   Patient presents with    Right Shoulder -  Pain        Subjective    Avinash Pascual is a right hand dominant 75 y.o. male who presents with right shoulder pain     History of Present Illness   The patient complains of right shoulder pain. The pain started 2 years ago without injury. At that time the patient describes lateral arm pain, weakness and soreness.    The patient rates the pain as a 6/10. The pain is located Lateral.The pain is described as Sore and Throbbing. The patient has tried Tylenol and tiger balm  for their problem.  The pain improves with tiger balm. The pain worsens with lifting and overhead.    The patient does have pain at night. The patient does have pain with overhead activity, and does describe weakness.     The pain does not go past the elbow. The patient does not have neck pain.The shoulder does not feel unstable. The patient does not have numbness.    Orth[facility] Sports Medicine Patient Answers  Failed to redirect to the Timeline version of the [employer] SmartLink.  No Known Allergies  Outpatient Encounter Medications as of 4/2/2025   Medication Sig Dispense Refill    ALPRAZolam (XANAX) 0.5 mg tablet Take 1 tablet (0.5 mg total) by mouth 3 (three) times a day as needed for anxiety 30 tablet 5    amLODIPine (NORVASC) 5 mg tablet Take 5 mg by mouth daily Blood presuure      atorvastatin (LIPITOR) 40 mg tablet TAKE 1 TABLET BY MOUTH EVERY DAY 30 tablet 0    metFORMIN (GLUCOPHAGE) 1000 MG tablet TAKE 1 TABLET BY MOUTH TWICE A  tablet 0    Ozempic, 1 MG/DOSE, 4 MG/3ML SOPN injection pen Inject 1 mg under the skin once a week      traZODone (DESYREL) 50 mg tablet Take 1 tablet by mouth daily at bedtime       No facility-administered encounter medications on file as of 4/2/2025.     Past Medical History:   Diagnosis Date    Hyperlipidemia     last assessed: 12/31/2018    Hypertension     last assessed: 12/31/2018    Osteoarthritis     last assessed: 12/31/2018    Type 2 diabetes mellitus (HCC)     last assessed: 12/31/2018     No past  surgical history on file.  Family History   Problem Relation Age of Onset    Parkinsonism Mother     Breast cancer Sister      Social History     Tobacco Use    Smoking status: Former     Current packs/day: 0.00     Average packs/day: 1 pack/day for 2.0 years (2.0 ttl pk-yrs)     Types: Cigarettes     Start date:      Quit date:      Years since quittin.2     Passive exposure: Past    Smokeless tobacco: Never   Vaping Use    Vaping status: Never Used   Substance Use Topics    Alcohol use: Not Currently    Drug use: Never           Objective      Vitals:    25 1342   Pulse: (!) 114   Resp: 16   Temp: 97.5 °F (36.4 °C)   SpO2: 97%     Body mass index is 30.1 kg/m².  Physical Exam    Shoulder Exam    Affected shoulder:   right    Skin: Without ecchymosis, wounds or prior incisions    Tenderness:  None    Range of Motion (active/passive):  Side Active (FE/ER/IR) Passive (FE/ER/IR)   right 160/50/Lumbar 170/55   left 160/50/Lumbar 170/55       Rotator Cuff Strength:  Side Supraspinatus Infraspinatus/Teres Subscapularis   right 4/5 5/5 5/5   left 5/5 5/5 5/5       Impingement and Rotator Cuff Exam:  Neer's test for impingement Positive   Mulligan' test for impingement Positive   Radha's test Positive   Belly Press Negative     Biceps Exam:  Heaters's test for SLAP tear: Negative   Jergeson's test for biciptal pain: Negative   Speeds test for biciptal pain: Negative       Shoulder Instability Exam:  The apprehension test for anterior instability: Negative   The relocation test: Negative   The posterior load and shift test: Negative         Cervical Spine Exam:  Tenderness: None  ROM: WNL  Spurlings: Negative    Distally the patient's neurovascular status is normal.    Additional exam: NA    Review of Prior Testing:    I independently interpreted the following test:     right shoulder x-ray images done on 25:  Multiple views show mild GH degenerative changes as well as AC joint changes, chronic  changes at the greater tuberosity, no fractures.           Follow Up: Return in about 3 months (around 7/2/2025).    All questions answered and patient agrees with plan.

## 2025-04-03 ENCOUNTER — RESULTS FOLLOW-UP (OUTPATIENT)
Dept: FAMILY MEDICINE CLINIC | Facility: CLINIC | Age: 75
End: 2025-04-03

## 2025-04-03 NOTE — TELEPHONE ENCOUNTER
Called patient to give him the results of his testing. Patient did not answer. I left a message for the patient to call the office.

## 2025-04-03 NOTE — TELEPHONE ENCOUNTER
Patient returned call, relayed results to patient as per provider message.     Patient expressed understanding and advised patient to   call if he had any further questions.     Thank you.

## 2025-04-03 NOTE — TELEPHONE ENCOUNTER
----- Message from Marv Torres DO sent at 4/3/2025  9:15 AM EDT -----  Ultrasound was negative for aortic aneurysm

## 2025-04-22 DIAGNOSIS — E78.5 HYPERLIPIDEMIA, UNSPECIFIED HYPERLIPIDEMIA TYPE: ICD-10-CM

## 2025-04-23 RX ORDER — ATORVASTATIN CALCIUM 40 MG/1
40 TABLET, FILM COATED ORAL DAILY
Qty: 90 TABLET | Refills: 1 | OUTPATIENT
Start: 2025-04-23

## 2025-04-26 DIAGNOSIS — E78.5 HYPERLIPIDEMIA, UNSPECIFIED HYPERLIPIDEMIA TYPE: ICD-10-CM

## 2025-04-28 RX ORDER — ATORVASTATIN CALCIUM 40 MG/1
40 TABLET, FILM COATED ORAL DAILY
Qty: 90 TABLET | Refills: 1 | Status: SHIPPED | OUTPATIENT
Start: 2025-04-28

## 2025-04-30 PROBLEM — Z12.5 SCREENING FOR PROSTATE CANCER: Status: RESOLVED | Noted: 2025-03-31 | Resolved: 2025-04-30

## 2025-04-30 PROBLEM — Z13.6 ENCOUNTER FOR ABDOMINAL AORTIC ANEURYSM SCREENING: Status: RESOLVED | Noted: 2025-03-31 | Resolved: 2025-04-30

## 2025-04-30 PROBLEM — Z12.11 COLON CANCER SCREENING: Status: RESOLVED | Noted: 2022-04-20 | Resolved: 2025-04-30

## 2025-04-30 PROBLEM — Z00.00 MEDICARE ANNUAL WELLNESS VISIT, SUBSEQUENT: Status: RESOLVED | Noted: 2025-03-31 | Resolved: 2025-04-30

## 2025-07-22 ENCOUNTER — RA CDI HCC (OUTPATIENT)
Dept: OTHER | Facility: HOSPITAL | Age: 75
End: 2025-07-22

## 2025-07-25 ENCOUNTER — TELEPHONE (OUTPATIENT)
Dept: FAMILY MEDICINE CLINIC | Facility: CLINIC | Age: 75
End: 2025-07-25

## 2025-07-25 NOTE — TELEPHONE ENCOUNTER
Called patient to remind him that he is due for blood work for his upcoming appointment. Patient did not answer. I left a message reminding him of this.